# Patient Record
Sex: MALE | Race: WHITE | Employment: OTHER | URBAN - METROPOLITAN AREA
[De-identification: names, ages, dates, MRNs, and addresses within clinical notes are randomized per-mention and may not be internally consistent; named-entity substitution may affect disease eponyms.]

---

## 2017-01-01 ENCOUNTER — HOSPITAL ENCOUNTER (EMERGENCY)
Age: 72
Discharge: HOME | End: 2017-07-25
Attending: EMERGENCY MEDICINE
Payer: COMMERCIAL

## 2017-01-01 ENCOUNTER — PATIENT OUTREACH (OUTPATIENT)
Dept: CASE MANAGEMENT | Age: 72
End: 2017-01-01

## 2017-01-01 ENCOUNTER — HOSPITAL ENCOUNTER (EMERGENCY)
Age: 72
Discharge: HOME | End: 2017-08-02
Payer: COMMERCIAL

## 2017-01-01 ENCOUNTER — APPOINTMENT (OUTPATIENT)
Dept: MRI IMAGING | Age: 72
End: 2017-01-01
Payer: COMMERCIAL

## 2017-01-01 ENCOUNTER — HOSPITAL ENCOUNTER (EMERGENCY)
Age: 72
Discharge: LEFT BEFORE DISCHARGE | End: 2017-07-30
Attending: EMERGENCY MEDICINE
Payer: COMMERCIAL

## 2017-01-01 ENCOUNTER — APPOINTMENT (OUTPATIENT)
Dept: CT IMAGING | Age: 72
End: 2017-01-01
Attending: EMERGENCY MEDICINE
Payer: COMMERCIAL

## 2017-01-01 ENCOUNTER — HOSPITAL ENCOUNTER (EMERGENCY)
Age: 72
Discharge: HOME | End: 2017-11-28
Attending: EMERGENCY MEDICINE
Payer: COMMERCIAL

## 2017-01-01 ENCOUNTER — HOSPITAL ENCOUNTER (EMERGENCY)
Age: 72
Discharge: HOME | End: 2017-09-11
Attending: EMERGENCY MEDICINE
Payer: COMMERCIAL

## 2017-01-01 ENCOUNTER — HOME HEALTH ADMISSION (OUTPATIENT)
Dept: HOME HEALTH SERVICES | Facility: HOME HEALTH | Age: 72
End: 2017-01-01

## 2017-01-01 VITALS
HEART RATE: 74 BPM | TEMPERATURE: 98.3 F | DIASTOLIC BLOOD PRESSURE: 68 MMHG | WEIGHT: 175 LBS | BODY MASS INDEX: 23.7 KG/M2 | RESPIRATION RATE: 16 BRPM | OXYGEN SATURATION: 96 % | SYSTOLIC BLOOD PRESSURE: 139 MMHG | HEIGHT: 72 IN

## 2017-01-01 VITALS
WEIGHT: 175 LBS | OXYGEN SATURATION: 97 % | HEART RATE: 64 BPM | HEIGHT: 72 IN | SYSTOLIC BLOOD PRESSURE: 160 MMHG | DIASTOLIC BLOOD PRESSURE: 70 MMHG | BODY MASS INDEX: 23.7 KG/M2 | RESPIRATION RATE: 18 BRPM | TEMPERATURE: 98.4 F

## 2017-01-01 VITALS
RESPIRATION RATE: 20 BRPM | OXYGEN SATURATION: 100 % | WEIGHT: 165 LBS | BODY MASS INDEX: 24.44 KG/M2 | HEIGHT: 69 IN | DIASTOLIC BLOOD PRESSURE: 78 MMHG | HEART RATE: 67 BPM | SYSTOLIC BLOOD PRESSURE: 158 MMHG | TEMPERATURE: 97.9 F

## 2017-01-01 VITALS
BODY MASS INDEX: 23.7 KG/M2 | TEMPERATURE: 98.8 F | HEART RATE: 90 BPM | DIASTOLIC BLOOD PRESSURE: 81 MMHG | RESPIRATION RATE: 18 BRPM | WEIGHT: 175 LBS | OXYGEN SATURATION: 98 % | SYSTOLIC BLOOD PRESSURE: 178 MMHG | HEIGHT: 72 IN

## 2017-01-01 VITALS
OXYGEN SATURATION: 95 % | HEIGHT: 69 IN | TEMPERATURE: 98.6 F | WEIGHT: 165 LBS | RESPIRATION RATE: 20 BRPM | SYSTOLIC BLOOD PRESSURE: 136 MMHG | HEART RATE: 80 BPM | BODY MASS INDEX: 24.44 KG/M2 | DIASTOLIC BLOOD PRESSURE: 75 MMHG

## 2017-01-01 DIAGNOSIS — F23 ACUTE PSYCHOSIS (HCC): ICD-10-CM

## 2017-01-01 DIAGNOSIS — R42 DIZZINESS: ICD-10-CM

## 2017-01-01 DIAGNOSIS — D64.9 ANEMIA, UNSPECIFIED TYPE: Primary | ICD-10-CM

## 2017-01-01 DIAGNOSIS — R42 VERTIGO: Primary | ICD-10-CM

## 2017-01-01 DIAGNOSIS — K92.2 GASTROINTESTINAL HEMORRHAGE, UNSPECIFIED GASTROINTESTINAL HEMORRHAGE TYPE: ICD-10-CM

## 2017-01-01 DIAGNOSIS — G47.00 INSOMNIA DISORDER WITH NON-SLEEP DISORDER MENTAL COMORBIDITY: Primary | ICD-10-CM

## 2017-01-01 LAB
ALBUMIN SERPL BCP-MCNC: 3.3 G/DL (ref 3.2–4.6)
ALBUMIN SERPL BCP-MCNC: 3.4 G/DL (ref 3.2–4.6)
ALBUMIN SERPL BCP-MCNC: 3.5 G/DL (ref 3.2–4.6)
ALBUMIN SERPL-MCNC: 3 G/DL (ref 3.2–4.6)
ALBUMIN SERPL-MCNC: 3.4 G/DL (ref 3.2–4.6)
ALBUMIN/GLOB SERPL: 0.8 {RATIO} (ref 1.2–3.5)
ALBUMIN/GLOB SERPL: 0.9 {RATIO} (ref 1.2–3.5)
ALBUMIN/GLOB SERPL: 1 {RATIO} (ref 1.2–3.5)
ALP SERPL-CCNC: 50 U/L (ref 50–136)
ALP SERPL-CCNC: 51 U/L (ref 50–136)
ALP SERPL-CCNC: 57 U/L (ref 50–136)
ALP SERPL-CCNC: 57 U/L (ref 50–136)
ALP SERPL-CCNC: 64 U/L (ref 50–136)
ALT SERPL-CCNC: 13 U/L (ref 12–65)
ALT SERPL-CCNC: 20 U/L (ref 12–65)
ALT SERPL-CCNC: 23 U/L (ref 12–65)
ALT SERPL-CCNC: 24 U/L (ref 12–65)
ALT SERPL-CCNC: 26 U/L (ref 12–65)
AMPHET UR QL SCN: NEGATIVE
ANION GAP BLD CALC-SCNC: 10 MMOL/L (ref 7–16)
ANION GAP BLD CALC-SCNC: 7 MMOL/L (ref 7–16)
ANION GAP BLD CALC-SCNC: 8 MMOL/L (ref 7–16)
ANION GAP SERPL CALC-SCNC: 12 MMOL/L (ref 7–16)
ANION GAP SERPL CALC-SCNC: 7 MMOL/L (ref 7–16)
APAP SERPL-MCNC: <2 UG/ML (ref 10–30)
AST SERPL W P-5'-P-CCNC: 13 U/L (ref 15–37)
AST SERPL W P-5'-P-CCNC: 20 U/L (ref 15–37)
AST SERPL W P-5'-P-CCNC: 21 U/L (ref 15–37)
AST SERPL-CCNC: 11 U/L (ref 15–37)
AST SERPL-CCNC: 16 U/L (ref 15–37)
ATRIAL RATE: 77 BPM
ATRIAL RATE: 97 BPM
BARBITURATES UR QL SCN: NEGATIVE
BASOPHILS # BLD AUTO: 0.1 K/UL (ref 0–0.2)
BASOPHILS # BLD AUTO: 0.1 K/UL (ref 0–0.2)
BASOPHILS # BLD: 0.1 K/UL (ref 0–0.2)
BASOPHILS # BLD: 0.1 K/UL (ref 0–0.2)
BASOPHILS # BLD: 1 % (ref 0–2)
BASOPHILS # BLD: 1 % (ref 0–2)
BASOPHILS NFR BLD: 1 % (ref 0–2)
BASOPHILS NFR BLD: 1 % (ref 0–2)
BENZODIAZ UR QL: NEGATIVE
BILIRUB SERPL-MCNC: 0.2 MG/DL (ref 0.2–1.1)
BUN SERPL-MCNC: 18 MG/DL (ref 8–23)
BUN SERPL-MCNC: 20 MG/DL (ref 8–23)
BUN SERPL-MCNC: 21 MG/DL (ref 8–23)
BUN SERPL-MCNC: 21 MG/DL (ref 8–23)
BUN SERPL-MCNC: 30 MG/DL (ref 8–23)
CALCIUM SERPL-MCNC: 8.2 MG/DL (ref 8.3–10.4)
CALCIUM SERPL-MCNC: 8.4 MG/DL (ref 8.3–10.4)
CALCIUM SERPL-MCNC: 8.8 MG/DL (ref 8.3–10.4)
CALCIUM SERPL-MCNC: 9.2 MG/DL (ref 8.3–10.4)
CALCIUM SERPL-MCNC: 9.7 MG/DL (ref 8.3–10.4)
CALCULATED P AXIS, ECG09: 64 DEGREES
CALCULATED P AXIS, ECG09: 73 DEGREES
CALCULATED R AXIS, ECG10: 37 DEGREES
CALCULATED R AXIS, ECG10: 60 DEGREES
CALCULATED T AXIS, ECG11: 56 DEGREES
CALCULATED T AXIS, ECG11: 83 DEGREES
CANNABINOIDS UR QL SCN: NEGATIVE
CHLORIDE SERPL-SCNC: 103 MMOL/L (ref 98–107)
CHLORIDE SERPL-SCNC: 105 MMOL/L (ref 98–107)
CHLORIDE SERPL-SCNC: 108 MMOL/L (ref 98–107)
CK SERPL-CCNC: 59 U/L (ref 21–215)
CO2 SERPL-SCNC: 21 MMOL/L (ref 21–32)
CO2 SERPL-SCNC: 22 MMOL/L (ref 21–32)
CO2 SERPL-SCNC: 23 MMOL/L (ref 21–32)
CO2 SERPL-SCNC: 26 MMOL/L (ref 21–32)
CO2 SERPL-SCNC: 27 MMOL/L (ref 21–32)
COCAINE UR QL SCN: NEGATIVE
CREAT SERPL-MCNC: 1.05 MG/DL (ref 0.8–1.5)
CREAT SERPL-MCNC: 1.06 MG/DL (ref 0.8–1.5)
CREAT SERPL-MCNC: 1.13 MG/DL (ref 0.8–1.5)
CREAT SERPL-MCNC: 1.15 MG/DL (ref 0.8–1.5)
CREAT SERPL-MCNC: 1.15 MG/DL (ref 0.8–1.5)
DIAGNOSIS, 93000: NORMAL
DIAGNOSIS, 93000: NORMAL
DIFFERENTIAL METHOD BLD: ABNORMAL
EOSINOPHIL # BLD: 0.1 K/UL (ref 0–0.8)
EOSINOPHIL # BLD: 0.2 K/UL (ref 0–0.8)
EOSINOPHIL # BLD: 0.2 K/UL (ref 0–0.8)
EOSINOPHIL # BLD: 0.3 K/UL (ref 0–0.8)
EOSINOPHIL NFR BLD: 2 % (ref 0.5–7.8)
EOSINOPHIL NFR BLD: 2 % (ref 0.5–7.8)
EOSINOPHIL NFR BLD: 3 % (ref 0.5–7.8)
EOSINOPHIL NFR BLD: 4 % (ref 0.5–7.8)
ERYTHROCYTE [DISTWIDTH] IN BLOOD BY AUTOMATED COUNT: 13.8 % (ref 11.9–14.6)
ERYTHROCYTE [DISTWIDTH] IN BLOOD BY AUTOMATED COUNT: 13.9 % (ref 11.9–14.6)
ERYTHROCYTE [DISTWIDTH] IN BLOOD BY AUTOMATED COUNT: 14.7 % (ref 11.9–14.6)
ERYTHROCYTE [DISTWIDTH] IN BLOOD BY AUTOMATED COUNT: 15.2 % (ref 11.9–14.6)
ETHANOL SERPL-MCNC: <3 MG/DL
GLOBULIN SER CALC-MCNC: 3.3 G/DL (ref 2.3–3.5)
GLOBULIN SER CALC-MCNC: 3.3 G/DL (ref 2.3–3.5)
GLOBULIN SER CALC-MCNC: 3.7 G/DL (ref 2.3–3.5)
GLOBULIN SER CALC-MCNC: 3.9 G/DL (ref 2.3–3.5)
GLOBULIN SER CALC-MCNC: 4.3 G/DL (ref 2.3–3.5)
GLUCOSE SERPL-MCNC: 104 MG/DL (ref 65–100)
GLUCOSE SERPL-MCNC: 105 MG/DL (ref 65–100)
GLUCOSE SERPL-MCNC: 86 MG/DL (ref 65–100)
GLUCOSE SERPL-MCNC: 87 MG/DL (ref 65–100)
GLUCOSE SERPL-MCNC: 98 MG/DL (ref 65–100)
HCT VFR BLD AUTO: 27.4 % (ref 41.1–50.3)
HCT VFR BLD AUTO: 28.1 % (ref 41.1–50.3)
HCT VFR BLD AUTO: 33.1 % (ref 41.1–50.3)
HCT VFR BLD AUTO: 34.1 % (ref 41.1–50.3)
HGB BLD-MCNC: 11.7 G/DL (ref 13.6–17.2)
HGB BLD-MCNC: 12.2 G/DL (ref 13.6–17.2)
HGB BLD-MCNC: 8.9 G/DL (ref 13.6–17.2)
HGB BLD-MCNC: 9.5 G/DL (ref 13.6–17.2)
IMM GRANULOCYTES # BLD: 0 K/UL (ref 0–0.5)
IMM GRANULOCYTES NFR BLD AUTO: 0 % (ref 0–5)
IMM GRANULOCYTES NFR BLD AUTO: 0.1 % (ref 0–5)
IMM GRANULOCYTES NFR BLD AUTO: 0.3 % (ref 0–5)
IMM GRANULOCYTES NFR BLD: 0.3 % (ref 0–5)
LYMPHOCYTES # BLD AUTO: 12 % (ref 13–44)
LYMPHOCYTES # BLD AUTO: 16 % (ref 13–44)
LYMPHOCYTES # BLD: 0.9 K/UL (ref 0.5–4.6)
LYMPHOCYTES # BLD: 0.9 K/UL (ref 0.5–4.6)
LYMPHOCYTES # BLD: 1 K/UL (ref 0.5–4.6)
LYMPHOCYTES # BLD: 1.4 K/UL (ref 0.5–4.6)
LYMPHOCYTES NFR BLD: 13 % (ref 13–44)
LYMPHOCYTES NFR BLD: 16 % (ref 13–44)
MAGNESIUM SERPL-MCNC: 2.1 MG/DL (ref 1.8–2.4)
MCH RBC QN AUTO: 27.1 PG (ref 26.1–32.9)
MCH RBC QN AUTO: 31.7 PG (ref 26.1–32.9)
MCH RBC QN AUTO: 32.4 PG (ref 26.1–32.9)
MCH RBC QN AUTO: 32.8 PG (ref 26.1–32.9)
MCHC RBC AUTO-ENTMCNC: 32.5 G/DL (ref 31.4–35)
MCHC RBC AUTO-ENTMCNC: 33.8 G/DL (ref 31.4–35)
MCHC RBC AUTO-ENTMCNC: 35.3 G/DL (ref 31.4–35)
MCHC RBC AUTO-ENTMCNC: 35.8 G/DL (ref 31.4–35)
MCV RBC AUTO: 83.3 FL (ref 79.6–97.8)
MCV RBC AUTO: 91.7 FL (ref 79.6–97.8)
MCV RBC AUTO: 91.7 FL (ref 79.6–97.8)
MCV RBC AUTO: 93.7 FL (ref 79.6–97.8)
METHADONE UR QL: NEGATIVE
MONOCYTES # BLD: 0.5 K/UL (ref 0.1–1.3)
MONOCYTES # BLD: 0.6 K/UL (ref 0.1–1.3)
MONOCYTES # BLD: 0.8 K/UL (ref 0.1–1.3)
MONOCYTES # BLD: 0.9 K/UL (ref 0.1–1.3)
MONOCYTES NFR BLD AUTO: 8 % (ref 4–12)
MONOCYTES NFR BLD AUTO: 8 % (ref 4–12)
MONOCYTES NFR BLD: 12 % (ref 4–12)
MONOCYTES NFR BLD: 9 % (ref 4–12)
NEUTS SEG # BLD: 4.4 K/UL (ref 1.7–8.2)
NEUTS SEG # BLD: 5.4 K/UL (ref 1.7–8.2)
NEUTS SEG # BLD: 6.1 K/UL (ref 1.7–8.2)
NEUTS SEG # BLD: 6.7 K/UL (ref 1.7–8.2)
NEUTS SEG NFR BLD AUTO: 72 % (ref 43–78)
NEUTS SEG NFR BLD AUTO: 77 % (ref 43–78)
NEUTS SEG NFR BLD: 70 % (ref 43–78)
NEUTS SEG NFR BLD: 72 % (ref 43–78)
OPIATES UR QL: NEGATIVE
P-R INTERVAL, ECG05: 178 MS
P-R INTERVAL, ECG05: 182 MS
PCP UR QL: NEGATIVE
PLATELET # BLD AUTO: 240 K/UL (ref 150–450)
PLATELET # BLD AUTO: 274 K/UL (ref 150–450)
PLATELET # BLD AUTO: 289 K/UL (ref 150–450)
PLATELET # BLD AUTO: 391 K/UL (ref 150–450)
PMV BLD AUTO: 10.3 FL (ref 10.8–14.1)
PMV BLD AUTO: 10.4 FL (ref 10.8–14.1)
PMV BLD AUTO: 10.6 FL (ref 10.8–14.1)
PMV BLD AUTO: 10.7 FL (ref 10.8–14.1)
POTASSIUM SERPL-SCNC: 3.9 MMOL/L (ref 3.5–5.1)
POTASSIUM SERPL-SCNC: 4.2 MMOL/L (ref 3.5–5.1)
POTASSIUM SERPL-SCNC: 4.4 MMOL/L (ref 3.5–5.1)
POTASSIUM SERPL-SCNC: 4.6 MMOL/L (ref 3.5–5.1)
POTASSIUM SERPL-SCNC: 5 MMOL/L (ref 3.5–5.1)
PROT SERPL-MCNC: 6.3 G/DL (ref 6.3–8.2)
PROT SERPL-MCNC: 6.6 G/DL (ref 6.3–8.2)
PROT SERPL-MCNC: 7.1 G/DL (ref 6.3–8.2)
PROT SERPL-MCNC: 7.3 G/DL (ref 6.3–8.2)
PROT SERPL-MCNC: 7.8 G/DL (ref 6.3–8.2)
Q-T INTERVAL, ECG07: 338 MS
Q-T INTERVAL, ECG07: 374 MS
QRS DURATION, ECG06: 84 MS
QRS DURATION, ECG06: 90 MS
QTC CALCULATION (BEZET), ECG08: 423 MS
QTC CALCULATION (BEZET), ECG08: 429 MS
RBC # BLD AUTO: 3 M/UL (ref 4.23–5.67)
RBC # BLD AUTO: 3.29 M/UL (ref 4.23–5.67)
RBC # BLD AUTO: 3.61 M/UL (ref 4.23–5.67)
RBC # BLD AUTO: 3.72 M/UL (ref 4.23–5.67)
SALICYLATES SERPL-MCNC: 6.8 MG/DL (ref 2.8–20)
SODIUM SERPL-SCNC: 137 MMOL/L (ref 136–145)
SODIUM SERPL-SCNC: 138 MMOL/L (ref 136–145)
SODIUM SERPL-SCNC: 139 MMOL/L (ref 136–145)
SODIUM SERPL-SCNC: 139 MMOL/L (ref 136–145)
SODIUM SERPL-SCNC: 142 MMOL/L (ref 136–145)
TSH SERPL DL<=0.005 MIU/L-ACNC: 5.75 UIU/ML (ref 0.36–3.74)
TSH SERPL DL<=0.005 MIU/L-ACNC: 7.43 UIU/ML (ref 0.36–3.74)
VENTRICULAR RATE, ECG03: 77 BPM
VENTRICULAR RATE, ECG03: 97 BPM
WBC # BLD AUTO: 6.1 K/UL (ref 4.3–11.1)
WBC # BLD AUTO: 7.7 K/UL (ref 4.3–11.1)
WBC # BLD AUTO: 7.9 K/UL (ref 4.3–11.1)
WBC # BLD AUTO: 9.2 K/UL (ref 4.3–11.1)

## 2017-01-01 PROCEDURE — 74011000250 HC RX REV CODE- 250: Performed by: EMERGENCY MEDICINE

## 2017-01-01 PROCEDURE — 81003 URINALYSIS AUTO W/O SCOPE: CPT | Performed by: EMERGENCY MEDICINE

## 2017-01-01 PROCEDURE — 74011250636 HC RX REV CODE- 250/636

## 2017-01-01 PROCEDURE — 85025 COMPLETE CBC W/AUTO DIFF WBC: CPT

## 2017-01-01 PROCEDURE — 80053 COMPREHEN METABOLIC PANEL: CPT

## 2017-01-01 PROCEDURE — 80053 COMPREHEN METABOLIC PANEL: CPT | Performed by: EMERGENCY MEDICINE

## 2017-01-01 PROCEDURE — 96372 THER/PROPH/DIAG INJ SC/IM: CPT

## 2017-01-01 PROCEDURE — 96374 THER/PROPH/DIAG INJ IV PUSH: CPT

## 2017-01-01 PROCEDURE — 93005 ELECTROCARDIOGRAM TRACING: CPT | Performed by: EMERGENCY MEDICINE

## 2017-01-01 PROCEDURE — 96376 TX/PRO/DX INJ SAME DRUG ADON: CPT

## 2017-01-01 PROCEDURE — 96375 TX/PRO/DX INJ NEW DRUG ADDON: CPT

## 2017-01-01 PROCEDURE — 84443 ASSAY THYROID STIM HORMONE: CPT | Performed by: EMERGENCY MEDICINE

## 2017-01-01 PROCEDURE — 93005 ELECTROCARDIOGRAM TRACING: CPT

## 2017-01-01 PROCEDURE — 74011250637 HC RX REV CODE- 250/637: Performed by: EMERGENCY MEDICINE

## 2017-01-01 PROCEDURE — 82550 ASSAY OF CK (CPK): CPT | Performed by: EMERGENCY MEDICINE

## 2017-01-01 PROCEDURE — 80307 DRUG TEST PRSMV CHEM ANLYZR: CPT | Performed by: EMERGENCY MEDICINE

## 2017-01-01 PROCEDURE — 96372 THER/PROPH/DIAG INJ SC/IM: CPT | Performed by: EMERGENCY MEDICINE

## 2017-01-01 PROCEDURE — 83735 ASSAY OF MAGNESIUM: CPT | Performed by: EMERGENCY MEDICINE

## 2017-01-01 PROCEDURE — 85025 COMPLETE CBC W/AUTO DIFF WBC: CPT | Performed by: EMERGENCY MEDICINE

## 2017-01-01 PROCEDURE — 74011250636 HC RX REV CODE- 250/636: Performed by: EMERGENCY MEDICINE

## 2017-01-01 PROCEDURE — 96375 TX/PRO/DX INJ NEW DRUG ADDON: CPT | Performed by: EMERGENCY MEDICINE

## 2017-01-01 PROCEDURE — 99284 EMERGENCY DEPT VISIT MOD MDM: CPT

## 2017-01-01 PROCEDURE — 96376 TX/PRO/DX INJ SAME DRUG ADON: CPT | Performed by: EMERGENCY MEDICINE

## 2017-01-01 PROCEDURE — 70450 CT HEAD/BRAIN W/O DYE: CPT

## 2017-01-01 PROCEDURE — 75810000275 HC EMERGENCY DEPT VISIT NO LEVEL OF CARE: Performed by: EMERGENCY MEDICINE

## 2017-01-01 PROCEDURE — 99285 EMERGENCY DEPT VISIT HI MDM: CPT | Performed by: EMERGENCY MEDICINE

## 2017-01-01 PROCEDURE — 99283 EMERGENCY DEPT VISIT LOW MDM: CPT | Performed by: EMERGENCY MEDICINE

## 2017-01-01 PROCEDURE — 96374 THER/PROPH/DIAG INJ IV PUSH: CPT | Performed by: EMERGENCY MEDICINE

## 2017-01-01 PROCEDURE — 81003 URINALYSIS AUTO W/O SCOPE: CPT

## 2017-01-01 PROCEDURE — 74011250637 HC RX REV CODE- 250/637

## 2017-01-01 RX ORDER — SODIUM CHLORIDE 0.9 % (FLUSH) 0.9 %
5-10 SYRINGE (ML) INJECTION AS NEEDED
Status: DISCONTINUED | OUTPATIENT
Start: 2017-01-01 | End: 2017-01-01 | Stop reason: HOSPADM

## 2017-01-01 RX ORDER — MECLIZINE HCL 12.5 MG 12.5 MG/1
12.5 TABLET ORAL
Qty: 15 TAB | Refills: 0 | Status: SHIPPED | OUTPATIENT
Start: 2017-01-01 | End: 2017-01-01

## 2017-01-01 RX ORDER — HALOPERIDOL 5 MG/ML
10 INJECTION INTRAMUSCULAR
Status: COMPLETED | OUTPATIENT
Start: 2017-01-01 | End: 2017-01-01

## 2017-01-01 RX ORDER — MECLIZINE HYDROCHLORIDE 25 MG/1
25 TABLET ORAL
Qty: 30 TAB | Refills: 0 | Status: SHIPPED | OUTPATIENT
Start: 2017-01-01 | End: 2017-01-01

## 2017-01-01 RX ORDER — LORAZEPAM 2 MG/ML
0.5 INJECTION INTRAMUSCULAR
Status: COMPLETED | OUTPATIENT
Start: 2017-01-01 | End: 2017-01-01

## 2017-01-01 RX ORDER — LORAZEPAM 2 MG/ML
1 INJECTION INTRAMUSCULAR
Status: COMPLETED | OUTPATIENT
Start: 2017-01-01 | End: 2017-01-01

## 2017-01-01 RX ORDER — PANTOPRAZOLE SODIUM 40 MG/1
40 TABLET, DELAYED RELEASE ORAL
Status: COMPLETED | OUTPATIENT
Start: 2017-01-01 | End: 2017-01-01

## 2017-01-01 RX ORDER — LORAZEPAM 2 MG/ML
INJECTION INTRAMUSCULAR
Status: COMPLETED
Start: 2017-01-01 | End: 2017-01-01

## 2017-01-01 RX ORDER — SODIUM CHLORIDE 0.9 % (FLUSH) 0.9 %
5-10 SYRINGE (ML) INJECTION EVERY 8 HOURS
Status: DISCONTINUED | OUTPATIENT
Start: 2017-01-01 | End: 2017-01-01 | Stop reason: HOSPADM

## 2017-01-01 RX ORDER — MECLIZINE HYDROCHLORIDE 25 MG/1
25 TABLET ORAL
COMMUNITY
End: 2017-01-01 | Stop reason: ALTCHOICE

## 2017-01-01 RX ORDER — MECLIZINE HYDROCHLORIDE 25 MG/1
50 TABLET ORAL
Status: COMPLETED | OUTPATIENT
Start: 2017-01-01 | End: 2017-01-01

## 2017-01-01 RX ORDER — TRAZODONE HYDROCHLORIDE 50 MG/1
50 TABLET ORAL
Qty: 31 TAB | Refills: 3 | Status: SHIPPED | OUTPATIENT
Start: 2017-01-01

## 2017-01-01 RX ORDER — DIPHENHYDRAMINE HYDROCHLORIDE 50 MG/ML
50 INJECTION, SOLUTION INTRAMUSCULAR; INTRAVENOUS
Status: COMPLETED | OUTPATIENT
Start: 2017-01-01 | End: 2017-01-01

## 2017-01-01 RX ORDER — HALOPERIDOL 5 MG/ML
5 INJECTION INTRAMUSCULAR
Status: DISCONTINUED | OUTPATIENT
Start: 2017-01-01 | End: 2017-01-01

## 2017-01-01 RX ORDER — MECLIZINE HYDROCHLORIDE 25 MG/1
25 TABLET ORAL
Status: DISCONTINUED | OUTPATIENT
Start: 2017-01-01 | End: 2017-01-01 | Stop reason: HOSPADM

## 2017-01-01 RX ORDER — LORAZEPAM 1 MG/1
1 TABLET ORAL
Status: COMPLETED | OUTPATIENT
Start: 2017-01-01 | End: 2017-01-01

## 2017-01-01 RX ORDER — SUCRALFATE 1 G/10ML
1 SUSPENSION ORAL
Status: COMPLETED | OUTPATIENT
Start: 2017-01-01 | End: 2017-01-01

## 2017-01-01 RX ORDER — DIPHENHYDRAMINE HYDROCHLORIDE 50 MG/ML
25 INJECTION, SOLUTION INTRAMUSCULAR; INTRAVENOUS
Status: COMPLETED | OUTPATIENT
Start: 2017-01-01 | End: 2017-01-01

## 2017-01-01 RX ORDER — PANTOPRAZOLE SODIUM 40 MG/1
40 TABLET, DELAYED RELEASE ORAL DAILY
Qty: 20 TAB | Refills: 0 | Status: SHIPPED | OUTPATIENT
Start: 2017-01-01 | End: 2017-01-01

## 2017-01-01 RX ADMIN — PANTOPRAZOLE SODIUM 40 MG: 40 TABLET, DELAYED RELEASE ORAL at 23:25

## 2017-01-01 RX ADMIN — LORAZEPAM 1 MG: 2 INJECTION, SOLUTION INTRAMUSCULAR; INTRAVENOUS at 07:58

## 2017-01-01 RX ADMIN — HALOPERIDOL LACTATE 10 MG: 5 INJECTION, SOLUTION INTRAMUSCULAR at 01:40

## 2017-01-01 RX ADMIN — LORAZEPAM 1 MG: 1 TABLET ORAL at 01:41

## 2017-01-01 RX ADMIN — SUCRALFATE 1 G: 1 SUSPENSION ORAL at 23:26

## 2017-01-01 RX ADMIN — DIPHENHYDRAMINE HYDROCHLORIDE 25 MG: 50 INJECTION, SOLUTION INTRAMUSCULAR; INTRAVENOUS at 03:56

## 2017-01-01 RX ADMIN — LORAZEPAM 0.5 MG: 2 INJECTION, SOLUTION INTRAMUSCULAR; INTRAVENOUS at 15:59

## 2017-01-01 RX ADMIN — HALOPERIDOL LACTATE 10 MG: 5 INJECTION, SOLUTION INTRAMUSCULAR at 03:56

## 2017-01-01 RX ADMIN — DIPHENHYDRAMINE HYDROCHLORIDE 50 MG: 50 INJECTION, SOLUTION INTRAMUSCULAR; INTRAVENOUS at 01:42

## 2017-01-01 RX ADMIN — WATER 10 MG: 1 INJECTION INTRAMUSCULAR; INTRAVENOUS; SUBCUTANEOUS at 19:52

## 2017-01-01 RX ADMIN — LORAZEPAM 1 MG: 2 INJECTION, SOLUTION INTRAMUSCULAR; INTRAVENOUS at 11:03

## 2017-01-01 RX ADMIN — LORAZEPAM 1 MG: 2 INJECTION INTRAMUSCULAR at 11:03

## 2017-01-01 RX ADMIN — LORAZEPAM 0.5 MG: 2 INJECTION INTRAMUSCULAR; INTRAVENOUS at 19:26

## 2017-01-01 RX ADMIN — MECLIZINE HYDROCHLORIDE 50 MG: 25 TABLET ORAL at 03:20

## 2017-07-25 NOTE — ED TRIAGE NOTES
Pt sent from [facility] Care today with c/o dizziness x's 2-3 days. Poor memory that started a week ago and ringing to left ear per pt.

## 2017-07-25 NOTE — DISCHARGE INSTRUCTIONS
Try medication. Call ENT doctior for appt to recheck. Also, call your primary care doctor to recheck. If symptoms continue, an MRI may be useful. Dizziness: Care Instructions  Your Care Instructions  Dizziness is the feeling of unsteadiness or fuzziness in your head. It is different than having vertigo, which is a feeling that the room is spinning or that you are moving or falling. It is also different from lightheadedness, which is the feeling that you are about to faint. It can be hard to know what causes dizziness. Some people feel dizzy when they have migraine headaches. Sometimes bouts of flu can make you feel dizzy. Some medical conditions, such as heart problems or high blood pressure, can make you feel dizzy. Many medicines can cause dizziness, including medicines for high blood pressure, pain, or anxiety. If a medicine causes your symptoms, your doctor may recommend that you stop or change the medicine. If it is a problem with your heart, you may need medicine to help your heart work better. If there is no clear reason for your symptoms, your doctor may suggest watching and waiting for a while to see if the dizziness goes away on its own. Follow-up care is a key part of your treatment and safety. Be sure to make and go to all appointments, and call your doctor if you are having problems. It's also a good idea to know your test results and keep a list of the medicines you take. How can you care for yourself at home? · If your doctor recommends or prescribes medicine, take it exactly as directed. Call your doctor if you think you are having a problem with your medicine. · Do not drive while you feel dizzy. · Try to prevent falls. Steps you can take include:  ¨ Using nonskid mats, adding grab bars near the tub, and using night-lights. ¨ Clearing your home so that walkways are free of anything you might trip on. ¨ Letting family and friends know that you have been feeling dizzy.  This will help them know how to help you. When should you call for help? Call 911 anytime you think you may need emergency care. For example, call if:  · You passed out (lost consciousness). · You have dizziness along with symptoms of a heart attack. These may include:  ¨ Chest pain or pressure, or a strange feeling in the chest.  ¨ Sweating. ¨ Shortness of breath. ¨ Nausea or vomiting. ¨ Pain, pressure, or a strange feeling in the back, neck, jaw, or upper belly or in one or both shoulders or arms. ¨ Lightheadedness or sudden weakness. ¨ A fast or irregular heartbeat. · You have symptoms of a stroke. These may include:  ¨ Sudden numbness, tingling, weakness, or loss of movement in your face, arm, or leg, especially on only one side of your body. ¨ Sudden vision changes. ¨ Sudden trouble speaking. ¨ Sudden confusion or trouble understanding simple statements. ¨ Sudden problems with walking or balance. ¨ A sudden, severe headache that is different from past headaches. Call your doctor now or seek immediate medical care if:  · You feel dizzy and have a fever, headache, or ringing in your ears. · You have new or increased nausea and vomiting. · Your dizziness does not go away or comes back. Watch closely for changes in your health, and be sure to contact your doctor if:  · You do not get better as expected. Where can you learn more? Go to http://agueda-matthew.info/. Enter I990 in the search box to learn more about \"Dizziness: Care Instructions. \"  Current as of: March 20, 2017  Content Version: 11.3  © 5573-9443 [employer]. Care instructions adapted under license by [employer] (which disclaims liability or warranty for this information). If you have questions about a medical condition or this instruction, always ask your healthcare professional. [address] any warranty or liability for your use of this information.          Vertigo: Care Instructions  Your Care Instructions  Vertigo is the feeling that you or your surroundings are moving when there is no actual movement. It is often described as a feeling of spinning, whirling, falling, or tilting. Vertigo may make you vomit or feel nauseated. You may have trouble standing or walking and may lose your balance. Vertigo is often related to an inner ear problem, but it can have other more serious causes. If vertigo continues, you may need more tests to find its cause. Follow-up care is a key part of your treatment and safety. Be sure to make and go to all appointments, and call your doctor if you are having problems. Its also a good idea to know your test results and keep a list of the medicines you take. How can you care for yourself at home? · Do not lie flat on your back. Prop yourself up slightly. This may reduce the spinning feeling. Keep your eyes open. · Move slowly so that you do not fall. · If your doctor recommends medicine, take it exactly as directed. · Do not drive while you are having vertigo. Certain exercises, called Mendez-Daroff exercises, can help decrease vertigo. To do Mendez-Daroff exercises:  · Sit on the edge of a bed or sofa and quickly lie down on the side that causes the worst vertigo. Lie on your side with your ear down. · Stay in this position for at least 30 seconds or until the vertigo goes away. · Sit up. If this causes vertigo, wait for it to stop. · Repeat the procedure on the other side. · Repeat this 10 times. Do these exercises 2 times a day until the vertigo is gone. When should you call for help? Call 911 anytime you think you may need emergency care. For example, call if:  · You passed out (lost consciousness). · You have symptoms of a stroke. These may include:  ¨ Sudden numbness, tingling, weakness, or loss of movement in your face, arm, or leg, especially on only one side of your body. ¨ Sudden vision changes.   ¨ Sudden trouble speaking. ¨ Sudden confusion or trouble understanding simple statements. ¨ Sudden problems with walking or balance. ¨ A sudden, severe headache that is different from past headaches. Call your doctor now or seek immediate medical care if:  · Vertigo occurs with a fever, a headache, or ringing in your ears. · You have new or increased nausea and vomiting. Watch closely for changes in your health, and be sure to contact your doctor if:  · Vertigo gets worse or happens more often. · Vertigo has not gotten better after 2 weeks. Where can you learn more? Go to http://agueda-matthew.info/. Enter F717 in the search box to learn more about \"Vertigo: Care Instructions. \"  Current as of: July 29, 2016  Content Version: 11.3  © 4384-3148 [employer]. Care instructions adapted under license by [employer] (which disclaims liability or warranty for this information). If you have questions about a medical condition or this instruction, always ask your healthcare professional. [place] [address] any warranty or liability for your use of this information.

## 2017-07-25 NOTE — ED PROVIDER NOTES
HPI Comments: 71-year-old male complains of dizziness for about 3 days. Worst when he stands. Some sensation of feeling off balance and feel like the room is spinning around and then again he states that he feels lightheaded at times. Complains of memory problems for a few weeks as well. No headache. No chest pain. No shortness of breath or fever. No cough. No change in vision speech or swallowing. No focal numbness or weakness. Seen at doctor's care and referred here. As a family doctor that treats him for hypertension and increased cholesterol and hypothyroidism. Patient is a 66 y.o. male presenting with dizziness. The history is provided by the patient. Dizziness   This is a new problem. The current episode started more than 2 days ago. The problem has not changed since onset. Primary symptoms include loss of balance and memory loss. Pertinent negatives include no focal weakness, no loss of sensation, no slurred speech, no speech difficulty, no movement disorder and no visual change. There has been no fever. Pertinent negatives include no shortness of breath, no chest pain, no vomiting, no headaches and no nausea. Past Medical History:   Diagnosis Date    Aortic valve regurgitation 11/23/2009    Chest Pain  7/6/2009    Dyslipidemia 7/6/2009    Endocrine disease     hypothyroid    Essential hypertension, benign 7/23/2009    Heart murmur 7/23/2009    Hyperlipidemia 8/8/2016    Hypothyroidism 7/6/2009    Tobacco use disorder 7/6/2009       History reviewed. No pertinent surgical history. History reviewed. No pertinent family history. Social History     Social History    Marital status: SINGLE     Spouse name: N/A    Number of children: N/A    Years of education: N/A     Occupational History    Not on file.      Social History Main Topics    Smoking status: Current Every Day Smoker     Packs/day: 1.00    Smokeless tobacco: Never Used    Alcohol use No    Drug use: No    Sexual activity: No     Other Topics Concern    Not on file     Social History Narrative         ALLERGIES: Shrimp and Tetanus vaccines and toxoid    Review of Systems   Constitutional: Negative for chills and fever. HENT: Positive for tinnitus (on occasion). Negative for ear pain. Respiratory: Negative for cough and shortness of breath. Cardiovascular: Negative for chest pain and palpitations. Gastrointestinal: Negative for abdominal pain, diarrhea, nausea and vomiting. Genitourinary: Negative for dysuria and flank pain. Musculoskeletal: Negative for back pain and neck pain. Skin: Negative for color change and rash. Neurological: Positive for dizziness and loss of balance. Negative for focal weakness, syncope, speech difficulty and headaches. Psychiatric/Behavioral: Positive for memory loss. All other systems reviewed and are negative. Vitals:    07/24/17 2008   BP: 160/70   Pulse: 90   Resp: 18   Temp: 98.8 °F (37.1 °C)   SpO2: 98%   Weight: 79.4 kg (175 lb)   Height: 6' (1.829 m)            Physical Exam   Constitutional: He is oriented to person, place, and time. He appears well-developed and well-nourished. No distress. HENT:   Head: Normocephalic and atraumatic. Mouth/Throat: Oropharynx is clear and moist. No oropharyngeal exudate. Eyes: Conjunctivae and EOM are normal. Pupils are equal, round, and reactive to light. Neck: Normal range of motion. Neck supple. Cardiovascular: Normal rate, regular rhythm and intact distal pulses. No murmur heard. Pulmonary/Chest: Breath sounds normal. No respiratory distress. Abdominal: No hernia. Neurological: He is alert and oriented to person, place, and time. Coordination and gait normal.   Nl speech  Ambulatory. No drift. Normal finger to nose and heel to shin testing. Skin: Skin is warm and dry. Psychiatric: He has a normal mood and affect. His speech is normal.   Nursing note and vitals reviewed.        MDM  Number of Diagnoses or Management Options  Diagnosis management comments: Head CT. Check electrolytes. Check thyroid. Patient has normal EKG CBC and urine from urgent care       Amount and/or Complexity of Data Reviewed  Clinical lab tests: reviewed and ordered  Tests in the radiology section of CPT®: ordered and reviewed  Independent visualization of images, tracings, or specimens: yes    Risk of Complications, Morbidity, and/or Mortality  Presenting problems: moderate  Diagnostic procedures: low  Management options: moderate      ED Course       Procedures    No obvious evidence for posterior ischemia. Given history of tinnitus, would favor peripheral vertigo         Results Include:    Recent Results (from the past 24 hour(s))   METABOLIC PANEL, COMPREHENSIVE    Collection Time: 07/24/17 11:08 PM   Result Value Ref Range    Sodium 137 136 - 145 mmol/L    Potassium 4.6 3.5 - 5.1 mmol/L    Chloride 103 98 - 107 mmol/L    CO2 27 21 - 32 mmol/L    Anion gap 7 7 - 16 mmol/L    Glucose 105 (H) 65 - 100 mg/dL    BUN 20 8 - 23 MG/DL    Creatinine 1.06 0.8 - 1.5 MG/DL    GFR est AA >60 >60 ml/min/1.73m2    GFR est non-AA >60 >60 ml/min/1.73m2    Calcium 9.2 8.3 - 10.4 MG/DL    Bilirubin, total 0.2 0.2 - 1.1 MG/DL    ALT (SGPT) 23 12 - 65 U/L    AST (SGOT) 21 15 - 37 U/L    Alk. phosphatase 57 50 - 136 U/L    Protein, total 7.8 6.3 - 8.2 g/dL    Albumin 3.5 3.2 - 4.6 g/dL    Globulin 4.3 (H) 2.3 - 3.5 g/dL    A-G Ratio 0.8 (L) 1.2 - 3.5     TSH 3RD GENERATION    Collection Time: 07/24/17 11:08 PM   Result Value Ref Range    TSH 7.433 (H) 0.358 - 3.740 uIU/mL     Trial of antivert.  ENT

## 2017-07-30 NOTE — ED NOTES
Patient requesting IV out notified that he would next to go back to bed continued to state that he wants to leave--family member also requesting patient to have IV removed--IV removed and patient is seen walking out of ed with visitor at this time

## 2017-08-02 NOTE — LETTER
NOTIFICATION RETURN TO WORK / SCHOOL 
 
8/2/2017 4:39 PM 
 
Mr. Anabela Edgar [address] [place] [address] Dr [id number] To Whom It May Concern: 
 
Anabela Edgar is currently under the care of [facility] EMERGENCY DEPT. Mr. Yordy Mojica is here to  patient and had to be excused from work early under ER doctor and RN's permission. Mr. Yordy Mojica may be excused from work today or may be excused from work early and may return today 8/2/17. He will return to work/school on: 8/3/17 If there are questions or concerns please have the patient contact our office.  
 
 
 
Sincerely, 
 
 
Pooja Whyte RN

## 2017-08-02 NOTE — ED NOTES
Pt began to become anxious in MRI again for second time. RN walked ativan 2 mg down per Dr. Sindy Sarkar to see if that helps calm patient down. MRI states they will try again to see if pt will cooperate.

## 2017-08-02 NOTE — ED TRIAGE NOTES
Pt arrives via [facility] from home. Per ems family called due to dizziness over last 2 weeks with progressive worsening tonight. Recently diagnosed new onset dementia. Pt a/o x1 on arrival, unable to verbalize year or . bgl 118 enroute with ems.

## 2017-08-02 NOTE — ED NOTES
RN called and spoke to [facility] from MRI to ask when pt will be able to get MRI scan. [facility] states pt will be put in transport \"in 10 minutes. \" Charge nurse has been notified.

## 2017-08-02 NOTE — ED NOTES
Bonny Ask, pt. Nephew called, no answer, voicemail left. Pt. Still sleeping at this time. NAD noted.

## 2017-08-02 NOTE — ED NOTES
MRI states they will send pt back due to the fact that pt will not stay still enough for MRI scan. Pt is stating \"I'm hungry, I want to walk out, I'm in pain. \"

## 2017-08-02 NOTE — ED NOTES
Pt sitting up on the side of the bed drinking coffee that RN provided pt. JANNY Hernandez spoke to [place] and he states he will get off work to come pick pt up and needs a work note to return back to work.

## 2017-08-02 NOTE — ED NOTES
Darien,nephew called, call forwarded. Bari Ace notified, and states he is currently at work and can not pick the patient up at this time and states he will see if his middle brother can pick the patient up.

## 2017-08-02 NOTE — ED NOTES
Called and spoke to MRI tech to see when pt will be transported for MRI scan. Tech stated pt was agitated the first time, RN told tech that she was aware and medicated pt. Pt is currently sleeping and MRI is aware. RN told tech that pt is able to follow commands and pt needs help with orientation when pt wakes up. Ashley stated \"we'll try. \"

## 2017-08-02 NOTE — ED NOTES
Attempted MRI on 2 separate occasions. The patient would not tolerate the confines of the MRI even with sedation.

## 2017-08-02 NOTE — ED NOTES
Pt to MRI and was advised about what MRI will scan and was notified that he has to be still for MRI scan. Pt states his understanding to RN.

## 2017-08-02 NOTE — ED NOTES
I have reviewed discharge instructions with the patient and caregiver (newphew). The patient and caregiver (nephew) verbalized understanding. Prescriptions given times one.

## 2017-08-02 NOTE — PROGRESS NOTES
Spiritual Care visit. Initial visit. By request, Visited 66year old patient in ED. Patient has some dementia, and is concerned about his physical, mental, emotional and spiritual life.  was called by RN to try to help calm him down.  spoke to him, trying to give him reassurance. Prayed for his spiritual and emotional concerns,  Encouraged him to tell physician about his physical and mental concerns.     Visit by Aayush Banks M.Ed., Th.B. ,Staff

## 2017-08-02 NOTE — ED NOTES
Dareen Sires for second time and he picked up. RN notified that pt is up for d/c and needs to be picked up. Dain Clarice states he is currently at work and gave another number for Leslie, [facility] [employer] [facility] [place] brother,  [phone]. RN called Leslie once and did not . Oziel Rossi states he will not be able to pick patient up until after 8 because he is currently at work.

## 2017-08-02 NOTE — ED NOTES
Halle Gates from MRI states pt is become increasingly combative ever since RN left and administered 1mg ativan. Pt was on the MRI table confused and verbalizing. When RN and Angelo Valencia from MRI released pts strap to administer ativan 1 mg. Pt began to tense up and RN had to hold pts arm down. Pt was not kicking, only movements of upper extremity. RN returned back to the ER and MRI calls again that pt is increasingly combative ever since RN left MRI after administration of meds. Dr. James Byrd has been paged overhead to speak to MRI. MRI techs state that pt will have to be taken off the MRI table and placed back in bed due to the combativeness and lack of cooperation.

## 2017-08-02 NOTE — ED PROVIDER NOTES
HPI Comments: 71-year-old male complaining of dizziness. Patient was recently seen in ED for some more complaints had a workup including a CT and lab work were all normal patient was diagnosed with dementia recently as well    Patient is a 66 y.o. male presenting with dizziness. The history is provided by the patient. Dizziness   This is a new problem. The problem has not changed since onset. Primary symptoms include mental status change and disorientation. There has been no fever. Associated symptoms include altered mental status and confusion. Past Medical History:   Diagnosis Date    Aortic valve regurgitation 11/23/2009    Chest Pain  7/6/2009    Dyslipidemia 7/6/2009    Endocrine disease     hypothyroid    Essential hypertension, benign 7/23/2009    Heart murmur 7/23/2009    Hyperlipidemia 8/8/2016    Hypothyroidism 7/6/2009    Tobacco use disorder 7/6/2009       No past surgical history on file. No family history on file. Social History     Social History    Marital status: SINGLE     Spouse name: N/A    Number of children: N/A    Years of education: N/A     Occupational History    Not on file. Social History Main Topics    Smoking status: Current Every Day Smoker     Packs/day: 1.00    Smokeless tobacco: Never Used    Alcohol use No    Drug use: No    Sexual activity: No     Other Topics Concern    Not on file     Social History Narrative         ALLERGIES: Shrimp and Tetanus vaccines and toxoid    Review of Systems   Constitutional: Negative. Negative for activity change. HENT: Negative. Eyes: Negative. Respiratory: Negative. Cardiovascular: Negative. Gastrointestinal: Negative. Genitourinary: Negative. Musculoskeletal: Negative. Skin: Negative. Neurological: Positive for dizziness. Psychiatric/Behavioral: Positive for confusion. All other systems reviewed and are negative.       Vitals:    08/02/17 0248   BP: 156/85   Pulse: 99   Resp: 20 Temp: 98.5 °F (36.9 °C)   SpO2: 99%   Weight: 79.4 kg (175 lb)   Height: 6' (1.829 m)            Physical Exam   Constitutional: He is oriented to person, place, and time. He appears well-developed and well-nourished. No distress. HENT:   Head: Normocephalic and atraumatic. Right Ear: External ear normal.   Left Ear: External ear normal.   Nose: Nose normal.   Mouth/Throat: Oropharynx is clear and moist. No oropharyngeal exudate. Eyes: Conjunctivae and EOM are normal. Pupils are equal, round, and reactive to light. Right eye exhibits no discharge. Left eye exhibits no discharge. No scleral icterus. Neck: Normal range of motion. Neck supple. No JVD present. No tracheal deviation present. Cardiovascular: Normal rate, regular rhythm and intact distal pulses. Pulmonary/Chest: Effort normal and breath sounds normal. No stridor. No respiratory distress. He has no wheezes. He exhibits no tenderness. Abdominal: Soft. Bowel sounds are normal. He exhibits no distension and no mass. There is no tenderness. Musculoskeletal: Normal range of motion. He exhibits no edema or tenderness. Neurological: He is alert and oriented to person, place, and time. No cranial nerve deficit. Skin: Skin is warm and dry. No rash noted. He is not diaphoretic. No erythema. No pallor. Psychiatric: Thought content normal. His affect is labile. He is agitated. Cognition and memory are impaired. Nursing note and vitals reviewed. MDM  Number of Diagnoses or Management Options  Diagnosis management comments: Patient dizzy and workups are negative ×2. Patient lives alone. We will hold the patient until  get involved with possible placement or assistance at home.        Amount and/or Complexity of Data Reviewed  Clinical lab tests: ordered and reviewed  Tests in the radiology section of CPT®: reviewed  Tests in the medicine section of CPT®: ordered and reviewed    Risk of Complications, Morbidity, and/or Mortality  Presenting problems: high  Diagnostic procedures: high  Management options: high      ED Course       Procedures

## 2017-08-02 NOTE — ED NOTES
Called pts nephew, Don Vickers, with no success. Don Vickers never picked up the phone, neither did Alice Ureña, pts other nephew.

## 2017-08-02 NOTE — PROGRESS NOTES
Spiritual Care Visit, in the Emergency Department. Follow up. Attempted visit. Patient was Prayer, for patient. Left a card for patient at bedside. Signed by [facility] Staff

## 2017-08-02 NOTE — ED NOTES
Pt was unable to full awake. Pt would mumble and will not open his eyes for RN. Dr. Magen Parkinson is aware. Pt  Cannot ambulate at this time.

## 2017-08-02 NOTE — DISCHARGE INSTRUCTIONS
Dizziness: Care Instructions  Your Care Instructions  Dizziness is the feeling of unsteadiness or fuzziness in your head. It is different than having vertigo, which is a feeling that the room is spinning or that you are moving or falling. It is also different from lightheadedness, which is the feeling that you are about to faint. It can be hard to know what causes dizziness. Some people feel dizzy when they have migraine headaches. Sometimes bouts of flu can make you feel dizzy. Some medical conditions, such as heart problems or high blood pressure, can make you feel dizzy. Many medicines can cause dizziness, including medicines for high blood pressure, pain, or anxiety. If a medicine causes your symptoms, your doctor may recommend that you stop or change the medicine. If it is a problem with your heart, you may need medicine to help your heart work better. If there is no clear reason for your symptoms, your doctor may suggest watching and waiting for a while to see if the dizziness goes away on its own. Follow-up care is a key part of your treatment and safety. Be sure to make and go to all appointments, and call your doctor if you are having problems. It's also a good idea to know your test results and keep a list of the medicines you take. How can you care for yourself at home? · If your doctor recommends or prescribes medicine, take it exactly as directed. Call your doctor if you think you are having a problem with your medicine. · Do not drive while you feel dizzy. · Try to prevent falls. Steps you can take include:  ¨ Using nonskid mats, adding grab bars near the tub, and using night-lights. ¨ Clearing your home so that walkways are free of anything you might trip on. ¨ Letting family and friends know that you have been feeling dizzy. This will help them know how to help you. When should you call for help? Call 911 anytime you think you may need emergency care.  For example, call if:  · You passed out (lost consciousness). · You have dizziness along with symptoms of a heart attack. These may include:  ¨ Chest pain or pressure, or a strange feeling in the chest.  ¨ Sweating. ¨ Shortness of breath. ¨ Nausea or vomiting. ¨ Pain, pressure, or a strange feeling in the back, neck, jaw, or upper belly or in one or both shoulders or arms. ¨ Lightheadedness or sudden weakness. ¨ A fast or irregular heartbeat. · You have symptoms of a stroke. These may include:  ¨ Sudden numbness, tingling, weakness, or loss of movement in your face, arm, or leg, especially on only one side of your body. ¨ Sudden vision changes. ¨ Sudden trouble speaking. ¨ Sudden confusion or trouble understanding simple statements. ¨ Sudden problems with walking or balance. ¨ A sudden, severe headache that is different from past headaches. Call your doctor now or seek immediate medical care if:  · You feel dizzy and have a fever, headache, or ringing in your ears. · You have new or increased nausea and vomiting. · Your dizziness does not go away or comes back. Watch closely for changes in your health, and be sure to contact your doctor if:  · You do not get better as expected. Where can you learn more? Go to http://agueda-matthew.info/. Enter D407 in the search box to learn more about \"Dizziness: Care Instructions. \"  Current as of: March 20, 2017  Content Version: 11.3  © 1666-1466 [email or web address]. Care instructions adapted under license by [employer] (which disclaims liability or warranty for this information). If you have questions about a medical condition or this instruction, always ask your healthcare professional. [place] [address] any warranty or liability for your use of this information. Vertigo: Care Instructions  Your Care Instructions  Vertigo is the feeling that you or your surroundings are moving when there is no actual movement.  It is often described as a feeling of spinning, whirling, falling, or tilting. Vertigo may make you vomit or feel nauseated. You may have trouble standing or walking and may lose your balance. Vertigo is often related to an inner ear problem, but it can have other more serious causes. If vertigo continues, you may need more tests to find its cause. Follow-up care is a key part of your treatment and safety. Be sure to make and go to all appointments, and call your doctor if you are having problems. Its also a good idea to know your test results and keep a list of the medicines you take. How can you care for yourself at home? · Do not lie flat on your back. Prop yourself up slightly. This may reduce the spinning feeling. Keep your eyes open. · Move slowly so that you do not fall. · If your doctor recommends medicine, take it exactly as directed. · Do not drive while you are having vertigo. Certain exercises, called Mendez-Daroff exercises, can help decrease vertigo. To do Mendez-Daroff exercises:  · Sit on the edge of a bed or sofa and quickly lie down on the side that causes the worst vertigo. Lie on your side with your ear down. · Stay in this position for at least 30 seconds or until the vertigo goes away. · Sit up. If this causes vertigo, wait for it to stop. · Repeat the procedure on the other side. · Repeat this 10 times. Do these exercises 2 times a day until the vertigo is gone. When should you call for help? Call 911 anytime you think you may need emergency care. For example, call if:  · You passed out (lost consciousness). · You have symptoms of a stroke. These may include:  ¨ Sudden numbness, tingling, weakness, or loss of movement in your face, arm, or leg, especially on only one side of your body. ¨ Sudden vision changes. ¨ Sudden trouble speaking. ¨ Sudden confusion or trouble understanding simple statements. ¨ Sudden problems with walking or balance.   ¨ A sudden, severe headache that is different from past headaches. Call your doctor now or seek immediate medical care if:  · Vertigo occurs with a fever, a headache, or ringing in your ears. · You have new or increased nausea and vomiting. Watch closely for changes in your health, and be sure to contact your doctor if:  · Vertigo gets worse or happens more often. · Vertigo has not gotten better after 2 weeks. Where can you learn more? Go to http://agueda-matthew.info/. Enter V846 in the search box to learn more about \"Vertigo: Care Instructions. \"  Current as of: July 29, 2016  Content Version: 11.3  © 4367-8562 [employer]. Care instructions adapted under license by [employer] (which disclaims liability or warranty for this information). If you have questions about a medical condition or this instruction, always ask your healthcare professional. [address] any warranty or liability for your use of this information.

## 2017-08-02 NOTE — ED NOTES
Dr. Heather Ramos spoke to Pranav orourke from [address]  Pt will be sent back to the ER and Villa sharad from MRI states \"we tried. \" Charge nurse is aware.

## 2017-08-02 NOTE — ED NOTES
Pt is now sleeping. Has asked for a small snack as he states he is neither eating or sleeping well. Family member has left to go home to  take his medications and will return or send another family member around 8-8:30 to talk to the . Pt is calmer since med administration.  AB

## 2017-08-02 NOTE — ED NOTES
Report and care to Sarthak [place] and [place]. PT remains asleep. Beltran Lynn nephew called and states he will not be able to come back as soon as he had thought. Provided some information on the pt's medications but is unsure how compliant he has been with his BP meds. Gives another contact as Kelly Cisse at [phone]. Beltran Lynn can be reached at [phone].  AB

## 2017-08-02 NOTE — ED NOTES
Pt states no pain but \"I feel like I am dying. \" He is confused to year. Knows he is in the hospital but can not state which one. States he lives alone and \"I just feel like I am going crazy. \" Describes a feeling of dizziness and c/o inability to walk. Family member Derandall Flo, nephew states he is afraid for the pt's safety at home as he lives alone.

## 2017-08-02 NOTE — PROGRESS NOTES
To room to speak with patient / lights out, patient with eyes closed. Per Dr. Mohit Craig, waiting on MRI results / if negative, patient to be discharged. Called and spoke with patient's nephew Celia Xiong [phone]) and he states patient lives alone, no children, never been . Per Perla Armstrong, he has neighbors that assist intermittently. Per Perla Armstrong, he still drives, smokes and is Caitlyn. \"   Per Perla Armstrong, his step-brother Ashley Bashir [phone]) also assists. Per Perla Armstrong, they used to live closer to patient but moved further away which makes it harder for them to be there. Discussed with Perla Armstrong that if MRI results are negative and nothing else if found that meets admit criteria , patient would have to be discharged home. Discussed long term options with Perla Armstrong such as [facility] or caregivers in the home, making him aware that these are both an out of pocket expense. CM will give resources for both to the family. Per Perla Armstrong, if patient gets put up for discharge, he or Diane Graham will come  patient.

## 2017-08-02 NOTE — ED NOTES
Pt back in room and is currently asleep snoring. Pt is not combative, nor is he alert. Pt is able to respond to voice and pain. Charge nurse and Dr. Issac Aguirre is aware of pts current status of being asleep and not combative at this time. MRI sent pt back due to pt being combative. No signs of combative or swinging at this time. Pt is currently asleep with call light within reach. Door open, bed rail up, lights on, continuous monitor.

## 2017-08-03 NOTE — PROGRESS NOTES
CC scheduled third outreach attempt follow-up call in 5 days. This note will not be viewable in [address]

## 2017-08-03 NOTE — PROGRESS NOTES
CC attempted to contact patient @ [phone]. Number is [phone] [facility]. Outreach attempt is unsuccessful. Will attempt second outreach in 24 hours. This note will not be viewable in [address]

## 2017-08-03 NOTE — PROGRESS NOTES
CC contacted PCP's office in regards to scheduling a follow-up appt. CC was able to schedule hospital follow-up up 8/10/17 @ 12p at [address] with Dr. Robe Lundberg.     Staff member agreed to contact patient and notify patient of appt date/time. This note will not be viewable in [address]

## 2017-09-12 NOTE — Clinical Note
See note. Not sure we are going to be able to do much here as no one in the family seems willing to assist, but we will try. Bartolo Lopez will go with you if you get a home visit scheduled.

## 2017-09-12 NOTE — PROGRESS NOTES
Referral from Dr Angel Parish to arrange follow up. Call to [facility] and spoke to [facility]. Appointment made for tomorrow 9/13 @ 2:15. Call to patient at number listed on face sheet and got [facility]. Call to emergency contact Bonny Huber ([phone]) - message left. Call to Aleah Zambrano who is another contact listed in his chart ([phone]) who gave me patient's home number ([phone]). Called number and got no answer - voicemail has not been set up so I could not leave a message.

## 2017-09-12 NOTE — PROGRESS NOTES
Referral received from ED CM who states has been unable to contact family or patient to inform of appointment at [address] for 9/13 at 2:15.    1. Call to Mr. Freya Nunn number: [phone]. No answer, no voice mailbox. 2. Call to Froylan Naranjo, nephew, who states he is at work. Froylan Naranjo states his uncle lives alone, has no spouse or children to help with caregiving. Froylan Naranjo has just started a new job and cannot leave during the day to assist his uncle. CM explained to Froylan Naranjo that the family will need to meet and discuss future care for Mr. Millicent Aguayo. Offered assistance of [facility] to complete POA and Medicaid application for future living assistance. Cm also asked Galeano Marcella to notify Mr. Millicent Aguayo of his appointment. Froylan Naranjo asked CM to call his brother, Martín Johnston to get message to Mr. Millicent Aguayo. [address] called at number provided by Froylan Naranjo: [phone]. This line answered by a female who states, \"this is [address] phone and don't call it. \" This person refused to identify herself, so no message left. Plan: Refer to [facility] to assist with POA and Medicaid application if patient agrees.

## 2017-09-12 NOTE — PROGRESS NOTES
No call from caryn Jimenez Notice - attempted to call again and still no answer. Call to [facility] again - left message. Call to other contact in file, Aaron Toney, ([phone]) - message left.

## 2017-09-12 NOTE — ED TRIAGE NOTES
Pt presents to ER for poss AMS after police were called to the [employer] station after pt has been there for almost 6 hrs. Pt states that he drove to the gas station but forgot his money, didn't leave at that time and had no one else to call. Pt is able to answer questions appropriately though has a echolalic-type questioning at times. BGL was 113, VSS while en route.

## 2017-09-12 NOTE — ED NOTES
I have reviewed discharge instructions with the patient. The patient verbalized understanding. Pt waiting in the [facility] provided cab to take him home.

## 2017-09-12 NOTE — PROGRESS NOTES
Call to patient's home number again and got no answer. Left another message with both Brittani Garcia and Marlyn Infatne.

## 2017-09-12 NOTE — DISCHARGE INSTRUCTIONS
Gastrointestinal Bleeding: Care Instructions  Your Care Instructions    The digestive or gastrointestinal tract goes from the mouth to the anus. It is often called the GI tract. Bleeding can happen anywhere in the GI tract. It may be caused by an ulcer, an infection, or cancer. It may also be caused by medicines such as aspirin or ibuprofen. Light bleeding may not cause any symptoms at first. But if you continue to bleed for a while, you may feel very weak or tired. Sudden, heavy bleeding means you need to see a doctor right away. This kind of bleeding can be very dangerous. But it can usually be cured or controlled. The doctor may do some tests to find the cause of your bleeding. Follow-up care is a key part of your treatment and safety. Be sure to make and go to all appointments, and call your doctor if you are having problems. It's also a good idea to know your test results and keep a list of the medicines you take. How can you care for yourself at home? · Be safe with medicines. Take your medicines exactly as prescribed. Call your doctor if you think you are having a problem with your medicine. You will get more details on the specific medicines your doctor prescribes. · Do not take aspirin or other anti-inflammatory medicines, such as naproxen (Aleve) or ibuprofen (Advil, Motrin), without talking to your doctor first. Ask your doctor if it is okay to use acetaminophen (Tylenol). · Do not drink alcohol. · The bleeding may make you lose iron. So it's important to eat foods that have a lot of iron. These include red meat, shellfish, poultry, and eggs. They also include beans, raisins, whole-grain breads, and leafy green vegetables. If you want help planning meals, you can make an appointment with a dietitian. When should you call for help? Call 911 anytime you think you may need emergency care. For example, call if:  · You have sudden, severe belly pain.   · You vomit blood or what looks like coffee grounds. · You passed out (lost consciousness). · Your stools are maroon or very bloody. Call your doctor now or seek immediate medical care if:  · You are dizzy or lightheaded, or you feel like you may faint. · Your stools are black and look like tar, or they have streaks of blood. · You have belly pain. · You vomit or have nausea. · You have trouble swallowing, or it hurts when you swallow. Watch closely for changes in your health, and be sure to contact your doctor if:  · You do not get better as expected. Where can you learn more? Go to http://agueda-matthew.info/. Enter P691 in the search box to learn more about \"Gastrointestinal Bleeding: Care Instructions. \"  Current as of: March 20, 2017  Content Version: 11.3  © 9603-4922 [employer]. Care instructions adapted under license by [employer] (which disclaims liability or warranty for this information). If you have questions about a medical condition or this instruction, always ask your healthcare professional. [place] [address] any warranty or liability for your use of this information. Anemia: Care Instructions  Your Care Instructions    Anemia is a low level of red blood cells, which carry oxygen throughout your body. Many things can cause anemia. Lack of iron is one of the most common causes. Your body needs iron to make hemoglobin, a substance in red blood cells that carries oxygen from the lungs to your body's cells. Without enough iron, the body produces fewer and smaller red blood cells. As a result, your body's cells do not get enough oxygen, and you feel tired and weak. And you may have trouble concentrating. Bleeding is the most common cause of a lack of iron. You may have heavy menstrual bleeding or bleeding caused by conditions such as ulcers, hemorrhoids, or cancer.  Regular use of aspirin or other anti-inflammatory medicines (such as ibuprofen) also can cause bleeding in some people. A lack of iron in your diet also can cause anemia, especially at times when the body needs more iron, such as during pregnancy, infancy, and the teen years. Your doctor may have prescribed iron pills. It may take several months of treatment for your iron levels to return to normal. Your doctor also may suggest that you eat foods that are rich in iron, such as meat and beans. There are many other causes of anemia. It is not always due to a lack of iron. Finding the specific cause of your anemia will help your doctor find the right treatment for you. Follow-up care is a key part of your treatment and safety. Be sure to make and go to all appointments, and call your doctor if you are having problems. It's also a good idea to know your test results and keep a list of the medicines you take. How can you care for yourself at home? · Take your medicines exactly as prescribed. Call your doctor if you think you are having a problem with your medicine. · If your doctor recommends iron pills, take them as directed:  ¨ Try to take the pills on an empty stomach about 1 hour before or 2 hours after meals. But you may need to take iron with food to avoid an upset stomach. ¨ Do not take antacids or drink milk or caffeine drinks (such as coffee, tea, or cola) at the same time or within 2 hours of the time that you take your iron. They can make it hard for your body to absorb the iron. ¨ Vitamin C (from food or supplements) helps your body absorb iron. Try taking iron pills with a glass of orange juice or some other food that is high in vitamin C, such as citrus fruits. ¨ Iron pills may cause stomach problems, such as heartburn, nausea, diarrhea, constipation, and cramps. Be sure to drink plenty of fluids, and include fruits, vegetables, and fiber in your diet each day. Iron pills often make your bowel movements dark or green.   ¨ If you forget to take an iron pill, do not take a double dose of iron the next time you take a pill. ¨ Keep iron pills out of the reach of small children. An overdose of iron can be very dangerous. · Follow your doctor's advice about eating iron-rich foods. These include red meat, shellfish, poultry, eggs, beans, raisins, whole-grain bread, and leafy green vegetables. · Steam vegetables to help them keep their iron content. When should you call for help? Call 911 anytime you think you may need emergency care. For example, call if:  · You have symptoms of a heart attack. These may include:  ¨ Chest pain or pressure, or a strange feeling in the chest.  ¨ Sweating. ¨ Shortness of breath. ¨ Nausea or vomiting. ¨ Pain, pressure, or a strange feeling in the back, neck, jaw, or upper belly or in one or both shoulders or arms. ¨ Lightheadedness or sudden weakness. ¨ A fast or irregular heartbeat. After you call 911, the  may tell you to chew 1 adult-strength or 2 to 4 low-dose aspirin. Wait for an ambulance. Do not try to drive yourself. · You passed out (lost consciousness). Call your doctor now or seek immediate medical care if:  · You have new or increased shortness of breath. · You are dizzy or lightheaded, or you feel like you may faint. · Your fatigue and weakness continue or get worse. · You have any abnormal bleeding, such as:  ¨ Nosebleeds. ¨ Vaginal bleeding that is different (heavier, more frequent, at a different time of the month) than what you are used to. ¨ Bloody or black stools, or rectal bleeding. ¨ Bloody or pink urine. Watch closely for changes in your health, and be sure to contact your doctor if:  · You do not get better as expected. Where can you learn more? Go to http://agueda-matthew.info/. Enter R301 in the search box to learn more about \"Anemia: Care Instructions. \"  Current as of: October 13, 2016  Content Version: 11.3  © 8944-9529 [employer].  Care instructions adapted under license by [facility] (which disclaims liability or warranty for this information). If you have questions about a medical condition or this instruction, always ask your healthcare professional. [place] [address] any warranty or liability for your use of this information. Well Visit, Over 72: Care Instructions  Your Care Instructions  Physical exams can help you stay healthy. Your doctor has checked your overall health and may have suggested ways to take good care of yourself. He or she also may have recommended tests. At home, you can help prevent illness with healthy eating, regular exercise, and other steps. Follow-up care is a key part of your treatment and safety. Be sure to make and go to all appointments, and call your doctor if you are having problems. It's also a good idea to know your test results and keep a list of the medicines you take. How can you care for yourself at home? · Reach and stay at a healthy weight. This will lower your risk for many problems, such as obesity, diabetes, heart disease, and high blood pressure. · Get at least 30 minutes of exercise on most days of the week. Walking is a good choice. You also may want to do other activities, such as running, swimming, cycling, or playing tennis or team sports. · Do not smoke. Smoking can make health problems worse. If you need help quitting, talk to your doctor about stop-smoking programs and medicines. These can increase your chances of quitting for good. · Protect your skin from too much sun. When you're outdoors from 10 a.m. to 4 p.m., stay in the shade or cover up with clothing and a hat with a wide brim. Wear sunglasses that block UV rays. Even when it's cloudy, put broad-spectrum sunscreen (SPF 30 or higher) on any exposed skin. · See a dentist one or two times a year for checkups and to have your teeth cleaned. · Wear a seat belt in the car. · Limit alcohol to 2 drinks a day for men and 1 drink a day for women.  Too much alcohol can cause health problems. Follow your doctor's advice about when to have certain tests. These tests can spot problems early. For men and women  · Cholesterol. Your doctor will tell you how often to have this done based on your overall health and other things that can increase your risk for heart attack and stroke. · Blood pressure. Have your blood pressure checked during a routine doctor visit. Your doctor will tell you how often to check your blood pressure based on your age, your blood pressure results, and other factors. · Diabetes. Ask your doctor whether you should have tests for diabetes. · Vision. Experts recommend that you have yearly exams for glaucoma and other age-related eye problems. · Hearing. Tell your doctor if you notice any change in your hearing. You can have tests to find out how well you hear. · Colon cancer tests. Keep having colon cancer tests as your doctor recommends. You can have one of several types of tests. · Heart attack and stroke risk. At least every 4 to 6 years, you should have your risk for heart attack and stroke assessed. Your doctor uses factors such as your age, blood pressure, cholesterol, and whether you smoke or have diabetes to show what your risk for a heart attack or stroke is over the next 10 years. · Osteoporosis. Talk to your doctor about whether you should have a bone density test to find out whether you have thinning bones. Also ask your doctor about whether you should take calcium and vitamin D supplements. For women  · Pap test and pelvic exam. You may no longer need a Pap test. Talk with your doctor about whether to stop or continue to have Pap tests. · Breast exam and mammogram. Ask how often you should have a mammogram, which is an X-ray of your breasts. A mammogram can spot breast cancer before it can be felt and when it is easiest to treat. · Thyroid disease.  Talk to your doctor about whether to have your thyroid checked as part of a regular physical exam. Women have an increased chance of a thyroid problem. For men  · Prostate exam. Talk to your doctor about whether you should have a blood test (called a PSA test) for prostate cancer. Experts disagree on whether men should have this test. Some experts recommend that you discuss the benefits and risks of the test with your doctor. · Abdominal aortic aneurysm. Ask your doctor whether you should have a test to check for an aneurysm. You may need a test if you ever smoked or if your parent, brother, sister, or child has had an aneurysm. When should you call for help? Watch closely for changes in your health, and be sure to contact your doctor if you have any problems or symptoms that concern you. Where can you learn more? Go to http://agueda-matthew.info/. Enter O428 in the search box to learn more about \"Well Visit, Over 65: Care Instructions. \"  Current as of: July 19, 2016  Content Version: 11.3  © 9780-6812 [employer]. Care instructions adapted under license by [employer] (which disclaims liability or warranty for this information). If you have questions about a medical condition or this instruction, always ask your healthcare professional. [address] any warranty or liability for your use of this information.
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
## 2017-09-13 NOTE — PROGRESS NOTES
Attempted to call patient at home and was unable to reach him. Left another message for nephew Almer Cabot. Also called [facility] and could not reach them. Call to [address] and spoke to Sundeep pisano -  cancelled appointment until I can reach someone.

## 2017-09-13 NOTE — PROGRESS NOTES
Social Work Cm outreached to pt's nephew again for f/u re: home visit. Natalie Greshamia, advised he spoke with pt yesterday and got some updates on ED visit and was able to discuss CM's thoughts on doing a visit to discuss need for POA and/or support in the home. Alejandro De La Torre advised that pt is not willing for CM's to do a visit at this time and is in a bit of \"denial\" about his confusion and forgetfulness. Luis Carlos's mother, pt's sister in law, is willing to meet but would like pt to be agreeable as well. Alejandro De La Torre did share that pt said he may be willing to allow an aide to be hired to check on him possibly. MERLE and Alejandro De La Torre agreed for CM to check back in one week to see if pt waivers. If not, CM will possibly supply a blank POA form as a minimum and then advise on how to complete. CM also advised can get a home aide company to come out to meet with pt/family to consult on assistance in the home. PIERO BLOOM updated RN CM. Next outreach on 9/20. This note will not be viewable in [address]

## 2017-09-13 NOTE — PROGRESS NOTES
Social Work CM received call back from pt's nephew, Bonny Huber, yesterday evening. CM introduced self and explained role as well as concerns with pt's care and memory. Cm stressed needing a few family members, if able, to be aware of certain concerns and a support if possible. Bonny Huber was unaware pt went to ED on Monday but did agree that pt has \"declined quickly\" from what little he knows. Bonny Huber reports that pt is his fathers brother (nephews father has passed) and that pt's sister in law, Sammy Mejia mother, would most likely be willing to meet with pt, Bonny Hubre, and CM's. Bonny Huber was going to check with his mother as well as his work to come up with some times he would be able to attend a home visit. CM planned to call pt today to inquire what was discussed, had to leave a vm. CM requested returned call. If CM misses call due to meeting, will be in touch and coordinate asap. RN CM updated. This note will not be viewable in [address]
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
## 2017-10-09 NOTE — PROGRESS NOTES
PIERO BLOOM made aware of update via RN CM re: pt caregiver status and PCP needs. PIERO CM removing from caseload and will not plan any further outreaches at this time. This note will not be viewable in [address]

## 2017-10-09 NOTE — PROGRESS NOTES
Follow-up call to verify PCP visit completed and assess for new issues. SPoke with Desire Sung. [facility] is living with Mr. Azra Hernández and taking care of his needs, including his medications. He reports Mr. Azra Hernández is no longer hesitant to take a shower with [facility] there to help him in and out. He also reports Mr. Azra Hernández is eating well. Mr. Azra Hernández has seen his PCA, Dr. Nadege Dobson in [facility]. He was given a Rx for triazolam for sleep. [facility] reports on days when Mr. Azra Hernández has had a full night's sleep, he shows no signs of dementia. However, when he has had little sleep, he is often confused over time, date, place. [facility] reports Dr. Nadege Dobson gave Mr. Azra Hernández a good report and he returns in 6 months for a check-up. [facility] states he will call cardiology for an appointment. No further CM issues; case closed.
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
## 2017-11-27 NOTE — PROGRESS NOTES
Spoke with David Gonzalez ([phone]) who states that he is the patient's POA. He states that when Mr. Maria C Bowers doesn't sleep well he acts out the next day. He states that the patient was prescribed something by Dr. Gilbert Pabon his PCP but Marek Ellis remember what it was. He states that it worked. He also states that they last time he tried to get it filled the insurance would not cover, but he states today that he is willing to pay for it out of pocket. He states that he does have someone living with the patient acting as caretakers.       Troy Carrero states that he is on his way to the hospital.
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
## 2017-11-27 NOTE — ED NOTES
Per Dr Lianna Herrera patient will stay in our care till morning. Patients POA reports that he has stayed awake for days without his sleeping meds. Says he is \"usually normal in the morning after sleeping. \"
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
## 2017-11-28 NOTE — DISCHARGE INSTRUCTIONS
Insomnia: Care Instructions  Your Care Instructions    Insomnia is the inability to sleep well. It is a common problem for most people at some time. Insomnia may make it hard for you to get to sleep, stay asleep, or sleep as long as you need to. This can make you tired and grouchy during the day. It can also make you forgetful, less effective at work, and unhappy. Insomnia can be caused by conditions such as depression or anxiety. Pain can also affect your ability to sleep. When these problems are solved, the insomnia usually clears up. But sometimes bad sleep habits can cause insomnia. If insomnia is affecting your work or your enjoyment of life, you can take steps to improve your sleep. Follow-up care is a key part of your treatment and safety. Be sure to make and go to all appointments, and call your doctor if you are having problems. It's also a good idea to know your test results and keep a list of the medicines you take. How can you care for yourself at home? What to avoid  · Do not have drinks with caffeine, such as coffee or black tea, for 8 hours before bed. · Do not smoke or use other types of tobacco near bedtime. Nicotine is a stimulant and can keep you awake. · Avoid drinking alcohol late in the evening, because it can cause you to wake in the middle of the night. · Do not eat a big meal close to bedtime. If you are hungry, eat a light snack. · Do not drink a lot of water close to bedtime, because the need to urinate may wake you up during the night. · Do not read or watch TV in bed. Use the bed only for sleeping and sexual activity. What to try  · Go to bed at the same time every night, and wake up at the same time every morning. Do not take naps during the day. · Keep your bedroom quiet, dark, and cool. · Sleep on a comfortable pillow and mattress. · If watching the clock makes you anxious, turn it facing away from you so you cannot see the time.   · If you worry when you lie down, start a worry book. Well before bedtime, write down your worries, and then set the book and your concerns aside. · Try meditation or other relaxation techniques before you go to bed. · If you cannot fall asleep, get up and go to another room until you feel sleepy. Do something relaxing. Repeat your bedtime routine before you go to bed again. · Make your house quiet and calm about an hour before bedtime. Turn down the lights, turn off the TV, log off the computer, and turn down the volume on music. This can help you relax after a busy day. When should you call for help? Watch closely for changes in your health, and be sure to contact your doctor if:  ? · Your efforts to improve your sleep do not work. ? · Your insomnia gets worse. ? · You have been feeling down, depressed, or hopeless or have lost interest in things that you usually enjoy. Where can you learn more? Go to http://aguedaVigilosmatthew.info/. Enter P513 in the search box to learn more about \"Insomnia: Care Instructions. \"  Current as of: July 26, 2016  Content Version: 11.4  © 6946-9713 [employer]. Care instructions adapted under license by [employer] (which disclaims liability or warranty for this information). If you have questions about a medical condition or this instruction, always ask your healthcare professional. [address] any warranty or liability for your use of this information. Learning About Sleeping Well  What does sleeping well mean? Sleeping well means getting enough sleep. How much sleep is enough varies among people. The number of hours you sleep is not as important as how you feel when you wake up. If you do not feel refreshed, you probably need more sleep. Another sign of not getting enough sleep is feeling tired during the day. The average total nightly sleep time is 7½ to 8 hours.  Healthy adults may need a little more or a little less than this.  Why is getting enough sleep important? Getting enough quality sleep is a basic part of good health. When your sleep suffers, your mood and your thoughts can suffer too. You may find yourself feeling more grumpy or stressed. Not getting enough sleep also can lead to serious problems, including injury, accidents, anxiety, and depression. What might cause poor sleeping? Many things can cause sleep problems, including:  · Stress. Stress can be caused by fear about a single event, such as giving a speech. Or you may have ongoing stress, such as worry about work or school. · Depression, anxiety, and other mental or emotional conditions. · Changes in your sleep habits or surroundings. This includes changes that happen where you sleep, such as noise, light, or sleeping in a different bed. It also includes changes in your sleep pattern, such as having jet lag or working a late shift. · Health problems, such as pain, breathing problems, and restless legs syndrome. · Lack of regular exercise. How can you help yourself? Here are some tips that may help you sleep more soundly and wake up feeling more refreshed. Your sleeping area  · Use your bedroom only for sleeping and sex. A bit of light reading may help you fall asleep. But if it doesn't, do your reading elsewhere in the house. Don't watch TV in bed. · Be sure your bed is big enough to stretch out comfortably, especially if you have a sleep partner. · Keep your bedroom quiet, dark, and cool. Use curtains, blinds, or a sleep mask to block out light. To block out noise, use earplugs, soothing music, or a \"white noise\" machine. Your evening and bedtime routine  · Create a relaxing bedtime routine. You might want to take a warm shower or bath, listen to soothing music, or drink a cup of noncaffeinated tea. · Go to bed at the same time every night. And get up at the same time every morning, even if you feel tired.   What to avoid  · Limit caffeine (coffee, tea, caffeinated sodas) during the day, and don't have any for at least 4 to 6 hours before bedtime. · Don't drink alcohol before bedtime. Alcohol can cause you to wake up more often during the night. · Don't smoke or use tobacco, especially in the evening. Nicotine can keep you awake. · Don't take naps during the day, especially close to bedtime. · Don't lie in bed awake for too long. If you can't fall asleep, or if you wake up in the middle of the night and can't get back to sleep within 15 minutes or so, get out of bed and go to another room until you feel sleepy. · Don't take medicine right before bed that may keep you awake or make you feel hyper or energized. Your doctor can tell you if your medicine may do this and if you can take it earlier in the day. If you can't sleep  · Imagine yourself in a peaceful, pleasant scene. Focus on the details and feelings of being in a place that is relaxing. · Get up and do a quiet or boring activity until you feel sleepy. · Don't drink any liquids after 6 p.m. if you wake up often because you have to go to the bathroom. Where can you learn more? Go to http://agueda-matthew.info/. Enter D684 in the search box to learn more about \"Learning About Sleeping Well. \"  Current as of: May 12, 2017  Content Version: 11.4  © 0128-3653 [employer]. Care instructions adapted under license by [employer] (which disclaims liability or warranty for this information). If you have questions about a medical condition or this instruction, always ask your healthcare professional. [place] [address] any warranty or liability for your use of this information.
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
## 2017-11-28 NOTE — ED NOTES
I have reviewed discharge instructions with the caregiver. The caregiver verbalized understanding. Patient left ED via Discharge Method: wheelchair to Home with family/caregiver. Opportunity for questions and clarification provided. Patient given 1 scripts. To continue your aftercare when you leave the hospital, you may receive an automated call from our care team to check in on how you are doing. This is a free service and part of our promise to provide the best care and service to meet your aftercare needs.  If you have questions, or wish to unsubscribe from this service please call [phone]. Thank you for Choosing our [facility] Emergency Department.

## 2017-11-28 NOTE — ED NOTES
Steph, care management, was at bedside with caregiver. pts caregiver currently speaking to Dr. Elma Walker at this time.

## 2018-01-01 ENCOUNTER — ANESTHESIA (OUTPATIENT)
Dept: ENDOSCOPY | Age: 73
DRG: 287 | End: 2018-01-01
Payer: COMMERCIAL

## 2018-01-01 ENCOUNTER — HOSPITAL ENCOUNTER (INPATIENT)
Age: 73
LOS: 7 days | Discharge: HOME | DRG: 287 | End: 2018-04-12
Attending: INTERNAL MEDICINE | Admitting: INTERNAL MEDICINE
Payer: COMMERCIAL

## 2018-01-01 ENCOUNTER — HOME HEALTH ADMISSION (OUTPATIENT)
Dept: HOME HEALTH SERVICES | Facility: HOME HEALTH | Age: 73
End: 2018-01-01

## 2018-01-01 ENCOUNTER — APPOINTMENT (OUTPATIENT)
Dept: CT IMAGING | Age: 73
DRG: 287 | End: 2018-01-01
Attending: HOSPITALIST
Payer: COMMERCIAL

## 2018-01-01 ENCOUNTER — ANESTHESIA EVENT (OUTPATIENT)
Dept: ENDOSCOPY | Age: 73
DRG: 287 | End: 2018-01-01
Payer: COMMERCIAL

## 2018-01-01 ENCOUNTER — APPOINTMENT (OUTPATIENT)
Dept: GENERAL RADIOLOGY | Age: 73
DRG: 287 | End: 2018-01-01
Attending: PHYSICIAN ASSISTANT
Payer: COMMERCIAL

## 2018-01-01 VITALS
DIASTOLIC BLOOD PRESSURE: 61 MMHG | BODY MASS INDEX: 26.23 KG/M2 | TEMPERATURE: 98 F | SYSTOLIC BLOOD PRESSURE: 108 MMHG | WEIGHT: 177.6 LBS | OXYGEN SATURATION: 96 % | HEART RATE: 69 BPM | RESPIRATION RATE: 18 BRPM

## 2018-01-01 DIAGNOSIS — R06.02 SOB (SHORTNESS OF BREATH): Primary | ICD-10-CM

## 2018-01-01 DIAGNOSIS — I35.0 AORTIC VALVE STENOSIS, ETIOLOGY OF CARDIAC VALVE DISEASE UNSPECIFIED: ICD-10-CM

## 2018-01-01 LAB
ABO + RH BLD: NORMAL
ALBUMIN SERPL-MCNC: 2.9 G/DL (ref 3.2–4.6)
ALBUMIN/GLOB SERPL: 0.8 {RATIO} (ref 1.2–3.5)
ALP SERPL-CCNC: 57 U/L (ref 50–136)
ALT SERPL-CCNC: 14 U/L (ref 12–65)
ANION GAP SERPL CALC-SCNC: 7 MMOL/L (ref 7–16)
ANION GAP SERPL CALC-SCNC: 7 MMOL/L (ref 7–16)
ANION GAP SERPL CALC-SCNC: 8 MMOL/L (ref 7–16)
ANION GAP SERPL CALC-SCNC: 9 MMOL/L (ref 7–16)
APPEARANCE UR: CLEAR
AST SERPL-CCNC: 11 U/L (ref 15–37)
ATRIAL RATE: 68 BPM
BASOPHILS # BLD: 0 K/UL (ref 0–0.2)
BASOPHILS NFR BLD: 0 % (ref 0–2)
BASOPHILS NFR BLD: 1 % (ref 0–2)
BASOPHILS NFR BLD: 1 % (ref 0–2)
BILIRUB SERPL-MCNC: 0.3 MG/DL (ref 0.2–1.1)
BILIRUB UR QL: NEGATIVE
BLD PROD TYP BPU: NORMAL
BLOOD GROUP ANTIBODIES SERPL: NORMAL
BNP SERPL-MCNC: 1510 PG/ML
BPU ID: NORMAL
BUN SERPL-MCNC: 18 MG/DL (ref 8–23)
BUN SERPL-MCNC: 21 MG/DL (ref 8–23)
BUN SERPL-MCNC: 27 MG/DL (ref 8–23)
BUN SERPL-MCNC: 38 MG/DL (ref 8–23)
BUN SERPL-MCNC: 46 MG/DL (ref 8–23)
BUN SERPL-MCNC: 54 MG/DL (ref 8–23)
CALCIUM SERPL-MCNC: 8.1 MG/DL (ref 8.3–10.4)
CALCIUM SERPL-MCNC: 8.1 MG/DL (ref 8.3–10.4)
CALCIUM SERPL-MCNC: 8.3 MG/DL (ref 8.3–10.4)
CALCIUM SERPL-MCNC: 8.5 MG/DL (ref 8.3–10.4)
CALCIUM SERPL-MCNC: 8.5 MG/DL (ref 8.3–10.4)
CALCIUM SERPL-MCNC: 8.7 MG/DL (ref 8.3–10.4)
CALCULATED P AXIS, ECG09: 50 DEGREES
CALCULATED R AXIS, ECG10: -49 DEGREES
CALCULATED T AXIS, ECG11: 98 DEGREES
CHLORIDE SERPL-SCNC: 105 MMOL/L (ref 98–107)
CHLORIDE SERPL-SCNC: 106 MMOL/L (ref 98–107)
CHLORIDE SERPL-SCNC: 108 MMOL/L (ref 98–107)
CHLORIDE SERPL-SCNC: 108 MMOL/L (ref 98–107)
CHLORIDE SERPL-SCNC: 110 MMOL/L (ref 98–107)
CHLORIDE SERPL-SCNC: 111 MMOL/L (ref 98–107)
CO2 SERPL-SCNC: 22 MMOL/L (ref 21–32)
CO2 SERPL-SCNC: 24 MMOL/L (ref 21–32)
CO2 SERPL-SCNC: 24 MMOL/L (ref 21–32)
CO2 SERPL-SCNC: 25 MMOL/L (ref 21–32)
CO2 SERPL-SCNC: 26 MMOL/L (ref 21–32)
CO2 SERPL-SCNC: 27 MMOL/L (ref 21–32)
COLOR UR: YELLOW
CREAT SERPL-MCNC: 1.08 MG/DL (ref 0.8–1.5)
CREAT SERPL-MCNC: 1.17 MG/DL (ref 0.8–1.5)
CREAT SERPL-MCNC: 1.31 MG/DL (ref 0.8–1.5)
CREAT SERPL-MCNC: 1.57 MG/DL (ref 0.8–1.5)
CREAT SERPL-MCNC: 1.77 MG/DL (ref 0.8–1.5)
CREAT SERPL-MCNC: 1.85 MG/DL (ref 0.8–1.5)
CROSSMATCH RESULT,%XM: NORMAL
DIAGNOSIS, 93000: NORMAL
DIFFERENTIAL METHOD BLD: ABNORMAL
EOSINOPHIL # BLD: 0 K/UL (ref 0–0.8)
EOSINOPHIL # BLD: 0.3 K/UL (ref 0–0.8)
EOSINOPHIL # BLD: 0.4 K/UL (ref 0–0.8)
EOSINOPHIL # BLD: 0.5 K/UL (ref 0–0.8)
EOSINOPHIL NFR BLD: 0 % (ref 0.5–7.8)
EOSINOPHIL NFR BLD: 4 % (ref 0.5–7.8)
EOSINOPHIL NFR BLD: 6 % (ref 0.5–7.8)
ERYTHROCYTE [DISTWIDTH] IN BLOOD BY AUTOMATED COUNT: 17.7 % (ref 11.9–14.6)
ERYTHROCYTE [DISTWIDTH] IN BLOOD BY AUTOMATED COUNT: 18.1 % (ref 11.9–14.6)
ERYTHROCYTE [DISTWIDTH] IN BLOOD BY AUTOMATED COUNT: 19 % (ref 11.9–14.6)
ERYTHROCYTE [DISTWIDTH] IN BLOOD BY AUTOMATED COUNT: 19.3 % (ref 11.9–14.6)
ERYTHROCYTE [DISTWIDTH] IN BLOOD BY AUTOMATED COUNT: 19.9 % (ref 11.9–14.6)
ERYTHROCYTE [DISTWIDTH] IN BLOOD BY AUTOMATED COUNT: 20.7 % (ref 11.9–14.6)
ERYTHROCYTE [DISTWIDTH] IN BLOOD BY AUTOMATED COUNT: 20.9 % (ref 11.9–14.6)
FERRITIN SERPL-MCNC: 13 NG/ML (ref 8–388)
FOLATE SERPL-MCNC: 47.8 NG/ML (ref 3.1–17.5)
GLOBULIN SER CALC-MCNC: 3.6 G/DL (ref 2.3–3.5)
GLUCOSE SERPL-MCNC: 100 MG/DL (ref 65–100)
GLUCOSE SERPL-MCNC: 108 MG/DL (ref 65–100)
GLUCOSE SERPL-MCNC: 109 MG/DL (ref 65–100)
GLUCOSE SERPL-MCNC: 88 MG/DL (ref 65–100)
GLUCOSE SERPL-MCNC: 92 MG/DL (ref 65–100)
GLUCOSE SERPL-MCNC: 93 MG/DL (ref 65–100)
GLUCOSE UR STRIP.AUTO-MCNC: NEGATIVE MG/DL
HAPTOGLOB SERPL-MCNC: 205 MG/DL (ref 30–200)
HCT VFR BLD AUTO: 17.2 % (ref 41.1–50.3)
HCT VFR BLD AUTO: 23.3 % (ref 41.1–50.3)
HCT VFR BLD AUTO: 26.6 % (ref 41.1–50.3)
HCT VFR BLD AUTO: 27.3 % (ref 41.1–50.3)
HCT VFR BLD AUTO: 27.3 % (ref 41.1–50.3)
HCT VFR BLD AUTO: 27.5 % (ref 41.1–50.3)
HCT VFR BLD AUTO: 28.1 % (ref 41.1–50.3)
HGB BLD-MCNC: 5.2 G/DL (ref 13.6–17.2)
HGB BLD-MCNC: 7.4 G/DL (ref 13.6–17.2)
HGB BLD-MCNC: 8.1 G/DL (ref 13.6–17.2)
HGB BLD-MCNC: 8.2 G/DL (ref 13.6–17.2)
HGB BLD-MCNC: 8.4 G/DL (ref 13.6–17.2)
HGB BLD-MCNC: 8.4 G/DL (ref 13.6–17.2)
HGB BLD-MCNC: 8.5 G/DL (ref 13.6–17.2)
HGB BLD-MCNC: 8.7 G/DL (ref 13.6–17.2)
HGB RETIC QN AUTO: 16 PG (ref 29–35)
HGB UR QL STRIP: NEGATIVE
IMM GRANULOCYTES # BLD: 0 K/UL (ref 0–0.5)
IMM GRANULOCYTES NFR BLD AUTO: 0 % (ref 0–5)
IMM GRANULOCYTES NFR BLD AUTO: 1 % (ref 0–5)
IMM RETICS NFR: 22.7 % (ref 2.3–13.4)
INR PPP: 1.1
IRON SATN MFR SERPL: 5 %
IRON SERPL-MCNC: 18 UG/DL (ref 35–150)
IRON SERPL-MCNC: 24 UG/DL (ref 35–150)
KETONES UR QL STRIP.AUTO: NEGATIVE MG/DL
LEUKOCYTE ESTERASE UR QL STRIP.AUTO: NEGATIVE
LYMPHOCYTES # BLD: 0.5 K/UL (ref 0.5–4.6)
LYMPHOCYTES # BLD: 0.7 K/UL (ref 0.5–4.6)
LYMPHOCYTES # BLD: 0.8 K/UL (ref 0.5–4.6)
LYMPHOCYTES # BLD: 0.9 K/UL (ref 0.5–4.6)
LYMPHOCYTES # BLD: 0.9 K/UL (ref 0.5–4.6)
LYMPHOCYTES # BLD: 1.1 K/UL (ref 0.5–4.6)
LYMPHOCYTES NFR BLD: 11 % (ref 13–44)
LYMPHOCYTES NFR BLD: 12 % (ref 13–44)
LYMPHOCYTES NFR BLD: 12 % (ref 13–44)
LYMPHOCYTES NFR BLD: 15 % (ref 13–44)
LYMPHOCYTES NFR BLD: 7 % (ref 13–44)
LYMPHOCYTES NFR BLD: 8 % (ref 13–44)
MAGNESIUM SERPL-MCNC: 2.5 MG/DL (ref 1.8–2.4)
MCH RBC QN AUTO: 24 PG (ref 26.1–32.9)
MCH RBC QN AUTO: 25.5 PG (ref 26.1–32.9)
MCH RBC QN AUTO: 25.6 PG (ref 26.1–32.9)
MCH RBC QN AUTO: 25.7 PG (ref 26.1–32.9)
MCH RBC QN AUTO: 25.9 PG (ref 26.1–32.9)
MCH RBC QN AUTO: 26.2 PG (ref 26.1–32.9)
MCH RBC QN AUTO: 26.7 PG (ref 26.1–32.9)
MCHC RBC AUTO-ENTMCNC: 30.2 G/DL (ref 31.4–35)
MCHC RBC AUTO-ENTMCNC: 30.2 G/DL (ref 31.4–35)
MCHC RBC AUTO-ENTMCNC: 30.5 G/DL (ref 31.4–35)
MCHC RBC AUTO-ENTMCNC: 30.8 G/DL (ref 31.4–35)
MCHC RBC AUTO-ENTMCNC: 30.8 G/DL (ref 31.4–35)
MCHC RBC AUTO-ENTMCNC: 31.8 G/DL (ref 31.4–35)
MCHC RBC AUTO-ENTMCNC: 31.9 G/DL (ref 31.4–35)
MCV RBC AUTO: 79.3 FL (ref 79.6–97.8)
MCV RBC AUTO: 80.6 FL (ref 79.6–97.8)
MCV RBC AUTO: 81.3 FL (ref 79.6–97.8)
MCV RBC AUTO: 83.4 FL (ref 79.6–97.8)
MCV RBC AUTO: 84.4 FL (ref 79.6–97.8)
MCV RBC AUTO: 85.7 FL (ref 79.6–97.8)
MCV RBC AUTO: 86.7 FL (ref 79.6–97.8)
MONOCYTES # BLD: 0.3 K/UL (ref 0.1–1.3)
MONOCYTES # BLD: 0.5 K/UL (ref 0.1–1.3)
MONOCYTES # BLD: 0.6 K/UL (ref 0.1–1.3)
MONOCYTES # BLD: 0.8 K/UL (ref 0.1–1.3)
MONOCYTES # BLD: 0.8 K/UL (ref 0.1–1.3)
MONOCYTES # BLD: 0.9 K/UL (ref 0.1–1.3)
MONOCYTES NFR BLD: 11 % (ref 4–12)
MONOCYTES NFR BLD: 11 % (ref 4–12)
MONOCYTES NFR BLD: 4 % (ref 4–12)
MONOCYTES NFR BLD: 7 % (ref 4–12)
MONOCYTES NFR BLD: 9 % (ref 4–12)
MONOCYTES NFR BLD: 9 % (ref 4–12)
NEUTS SEG # BLD: 5.1 K/UL (ref 1.7–8.2)
NEUTS SEG # BLD: 5.3 K/UL (ref 1.7–8.2)
NEUTS SEG # BLD: 5.4 K/UL (ref 1.7–8.2)
NEUTS SEG # BLD: 5.5 K/UL (ref 1.7–8.2)
NEUTS SEG # BLD: 6.6 K/UL (ref 1.7–8.2)
NEUTS SEG # BLD: 6.8 K/UL (ref 1.7–8.2)
NEUTS SEG NFR BLD: 69 % (ref 43–78)
NEUTS SEG NFR BLD: 70 % (ref 43–78)
NEUTS SEG NFR BLD: 72 % (ref 43–78)
NEUTS SEG NFR BLD: 74 % (ref 43–78)
NEUTS SEG NFR BLD: 79 % (ref 43–78)
NEUTS SEG NFR BLD: 89 % (ref 43–78)
NITRITE UR QL STRIP.AUTO: NEGATIVE
P-R INTERVAL, ECG05: 192 MS
PH UR STRIP: 5 [PH] (ref 5–9)
PLATELET # BLD AUTO: 316 K/UL (ref 150–450)
PLATELET # BLD AUTO: 320 K/UL (ref 150–450)
PLATELET # BLD AUTO: 338 K/UL (ref 150–450)
PLATELET # BLD AUTO: 340 K/UL (ref 150–450)
PLATELET # BLD AUTO: 349 K/UL (ref 150–450)
PLATELET # BLD AUTO: 376 K/UL (ref 150–450)
PLATELET # BLD AUTO: 411 K/UL (ref 150–450)
PMV BLD AUTO: 10 FL (ref 10.8–14.1)
PMV BLD AUTO: 10.4 FL (ref 10.8–14.1)
PMV BLD AUTO: 10.5 FL (ref 10.8–14.1)
PMV BLD AUTO: 10.7 FL (ref 10.8–14.1)
PMV BLD AUTO: 9.4 FL (ref 10.8–14.1)
PMV BLD AUTO: 9.5 FL (ref 10.8–14.1)
PMV BLD AUTO: 9.7 FL (ref 10.8–14.1)
POTASSIUM SERPL-SCNC: 3.4 MMOL/L (ref 3.5–5.1)
POTASSIUM SERPL-SCNC: 3.7 MMOL/L (ref 3.5–5.1)
POTASSIUM SERPL-SCNC: 3.8 MMOL/L (ref 3.5–5.1)
POTASSIUM SERPL-SCNC: 4.1 MMOL/L (ref 3.5–5.1)
POTASSIUM SERPL-SCNC: 4.4 MMOL/L (ref 3.5–5.1)
POTASSIUM SERPL-SCNC: 5 MMOL/L (ref 3.5–5.1)
PROT SERPL-MCNC: 6.5 G/DL (ref 6.3–8.2)
PROT UR STRIP-MCNC: NEGATIVE MG/DL
PROTHROMBIN TIME: 14 SEC (ref 11.5–14.5)
Q-T INTERVAL, ECG07: 436 MS
QRS DURATION, ECG06: 94 MS
QTC CALCULATION (BEZET), ECG08: 463 MS
RBC # BLD AUTO: 2.17 M/UL (ref 4.23–5.67)
RBC # BLD AUTO: 2.89 M/UL (ref 4.23–5.67)
RBC # BLD AUTO: 3.15 M/UL (ref 4.23–5.67)
RBC # BLD AUTO: 3.19 M/UL (ref 4.23–5.67)
RBC # BLD AUTO: 3.21 M/UL (ref 4.23–5.67)
RBC # BLD AUTO: 3.33 M/UL (ref 4.23–5.67)
RBC # BLD AUTO: 3.36 M/UL (ref 4.23–5.67)
RETICS # AUTO: 0.1 M/UL (ref 0.03–0.1)
RETICS/RBC NFR AUTO: 4.8 % (ref 0.3–2)
RPR SER QL: NONREACTIVE
SODIUM SERPL-SCNC: 139 MMOL/L (ref 136–145)
SODIUM SERPL-SCNC: 139 MMOL/L (ref 136–145)
SODIUM SERPL-SCNC: 140 MMOL/L (ref 136–145)
SODIUM SERPL-SCNC: 141 MMOL/L (ref 136–145)
SODIUM SERPL-SCNC: 142 MMOL/L (ref 136–145)
SODIUM SERPL-SCNC: 142 MMOL/L (ref 136–145)
SP GR UR REFRACTOMETRY: 1.01 (ref 1–1.02)
SPECIMEN EXP DATE BLD: NORMAL
STATUS OF UNIT,%ST: NORMAL
T4 FREE SERPL-MCNC: 1.3 NG/DL (ref 0.78–1.46)
TIBC SERPL-MCNC: 384 UG/DL (ref 250–450)
TSH SERPL DL<=0.005 MIU/L-ACNC: 0.29 UIU/ML (ref 0.36–3.74)
UNIT DIVISION, %UDIV: 0
UROBILINOGEN UR QL STRIP.AUTO: 0.2 EU/DL (ref 0.2–1)
VENTRICULAR RATE, ECG03: 68 BPM
VIT B12 SERPL-MCNC: 389 PG/ML (ref 193–986)
WBC # BLD AUTO: 7 K/UL (ref 4.3–11.1)
WBC # BLD AUTO: 7.5 K/UL (ref 4.3–11.1)
WBC # BLD AUTO: 7.6 K/UL (ref 4.3–11.1)
WBC # BLD AUTO: 7.7 K/UL (ref 4.3–11.1)
WBC # BLD AUTO: 8.4 K/UL (ref 4.3–11.1)

## 2018-01-01 PROCEDURE — 36415 COLL VENOUS BLD VENIPUNCTURE: CPT | Performed by: PHYSICIAN ASSISTANT

## 2018-01-01 PROCEDURE — 74011250636 HC RX REV CODE- 250/636: Performed by: PHYSICIAN ASSISTANT

## 2018-01-01 PROCEDURE — 93451 RIGHT HEART CATH: CPT

## 2018-01-01 PROCEDURE — 86923 COMPATIBILITY TEST ELECTRIC: CPT | Performed by: PHYSICIAN ASSISTANT

## 2018-01-01 PROCEDURE — 74011250636 HC RX REV CODE- 250/636: Performed by: HOSPITALIST

## 2018-01-01 PROCEDURE — 74011250637 HC RX REV CODE- 250/637: Performed by: INTERNAL MEDICINE

## 2018-01-01 PROCEDURE — 36415 COLL VENOUS BLD VENIPUNCTURE: CPT | Performed by: INTERNAL MEDICINE

## 2018-01-01 PROCEDURE — 74011250637 HC RX REV CODE- 250/637: Performed by: PHYSICIAN ASSISTANT

## 2018-01-01 PROCEDURE — 84443 ASSAY THYROID STIM HORMONE: CPT | Performed by: HOSPITALIST

## 2018-01-01 PROCEDURE — 83880 ASSAY OF NATRIURETIC PEPTIDE: CPT | Performed by: PHYSICIAN ASSISTANT

## 2018-01-01 PROCEDURE — 36430 TRANSFUSION BLD/BLD COMPNT: CPT

## 2018-01-01 PROCEDURE — 74011250636 HC RX REV CODE- 250/636: Performed by: INTERNAL MEDICINE

## 2018-01-01 PROCEDURE — 74011636320 HC RX REV CODE- 636/320: Performed by: INTERNAL MEDICINE

## 2018-01-01 PROCEDURE — 77030039270 HC TU BLD FLTR CARD -A

## 2018-01-01 PROCEDURE — 93005 ELECTROCARDIOGRAM TRACING: CPT | Performed by: INTERNAL MEDICINE

## 2018-01-01 PROCEDURE — 4A023N6 MEASUREMENT OF CARDIAC SAMPLING AND PRESSURE, RIGHT HEART, PERCUTANEOUS APPROACH: ICD-10-PCS | Performed by: INTERNAL MEDICINE

## 2018-01-01 PROCEDURE — 85025 COMPLETE CBC W/AUTO DIFF WBC: CPT | Performed by: INTERNAL MEDICINE

## 2018-01-01 PROCEDURE — 99153 MOD SED SAME PHYS/QHP EA: CPT

## 2018-01-01 PROCEDURE — 65660000000 HC RM CCU STEPDOWN

## 2018-01-01 PROCEDURE — 93456 R HRT CORONARY ARTERY ANGIO: CPT

## 2018-01-01 PROCEDURE — 77030032490 HC SLV COMPR SCD KNE COVD -B

## 2018-01-01 PROCEDURE — 80048 BASIC METABOLIC PNL TOTAL CA: CPT | Performed by: INTERNAL MEDICINE

## 2018-01-01 PROCEDURE — 85046 RETICYTE/HGB CONCENTRATE: CPT | Performed by: PHYSICIAN ASSISTANT

## 2018-01-01 PROCEDURE — 94760 N-INVAS EAR/PLS OXIMETRY 1: CPT

## 2018-01-01 PROCEDURE — 77010033678 HC OXYGEN DAILY

## 2018-01-01 PROCEDURE — 82728 ASSAY OF FERRITIN: CPT | Performed by: PHYSICIAN ASSISTANT

## 2018-01-01 PROCEDURE — 97530 THERAPEUTIC ACTIVITIES: CPT

## 2018-01-01 PROCEDURE — 82746 ASSAY OF FOLIC ACID SERUM: CPT | Performed by: HOSPITALIST

## 2018-01-01 PROCEDURE — 74011000258 HC RX REV CODE- 258: Performed by: INTERNAL MEDICINE

## 2018-01-01 PROCEDURE — 83540 ASSAY OF IRON: CPT | Performed by: PHYSICIAN ASSISTANT

## 2018-01-01 PROCEDURE — 81003 URINALYSIS AUTO W/O SCOPE: CPT | Performed by: HOSPITALIST

## 2018-01-01 PROCEDURE — 97161 PT EVAL LOW COMPLEX 20 MIN: CPT

## 2018-01-01 PROCEDURE — 76060000031 HC ANESTHESIA FIRST 0.5 HR: Performed by: INTERNAL MEDICINE

## 2018-01-01 PROCEDURE — P9016 RBC LEUKOCYTES REDUCED: HCPCS | Performed by: PHYSICIAN ASSISTANT

## 2018-01-01 PROCEDURE — 74011250637 HC RX REV CODE- 250/637: Performed by: HOSPITALIST

## 2018-01-01 PROCEDURE — 86901 BLOOD TYPING SEROLOGIC RH(D): CPT | Performed by: PHYSICIAN ASSISTANT

## 2018-01-01 PROCEDURE — 74011000250 HC RX REV CODE- 250: Performed by: INTERNAL MEDICINE

## 2018-01-01 PROCEDURE — C9113 INJ PANTOPRAZOLE SODIUM, VIA: HCPCS | Performed by: INTERNAL MEDICINE

## 2018-01-01 PROCEDURE — 99152 MOD SED SAME PHYS/QHP 5/>YRS: CPT

## 2018-01-01 PROCEDURE — 85027 COMPLETE CBC AUTOMATED: CPT | Performed by: PHYSICIAN ASSISTANT

## 2018-01-01 PROCEDURE — 85025 COMPLETE CBC W/AUTO DIFF WBC: CPT | Performed by: PHYSICIAN ASSISTANT

## 2018-01-01 PROCEDURE — 80053 COMPREHEN METABOLIC PANEL: CPT | Performed by: PHYSICIAN ASSISTANT

## 2018-01-01 PROCEDURE — 82607 VITAMIN B-12: CPT | Performed by: HOSPITALIST

## 2018-01-01 PROCEDURE — 83735 ASSAY OF MAGNESIUM: CPT | Performed by: PHYSICIAN ASSISTANT

## 2018-01-01 PROCEDURE — 85610 PROTHROMBIN TIME: CPT | Performed by: PHYSICIAN ASSISTANT

## 2018-01-01 PROCEDURE — 85018 HEMOGLOBIN: CPT | Performed by: PHYSICIAN ASSISTANT

## 2018-01-01 PROCEDURE — 74011000250 HC RX REV CODE- 250

## 2018-01-01 PROCEDURE — 93454 CORONARY ARTERY ANGIO S&I: CPT

## 2018-01-01 PROCEDURE — 84439 ASSAY OF FREE THYROXINE: CPT | Performed by: HOSPITALIST

## 2018-01-01 PROCEDURE — 80048 BASIC METABOLIC PNL TOTAL CA: CPT | Performed by: PHYSICIAN ASSISTANT

## 2018-01-01 PROCEDURE — 70450 CT HEAD/BRAIN W/O DYE: CPT

## 2018-01-01 PROCEDURE — 71046 X-RAY EXAM CHEST 2 VIEWS: CPT

## 2018-01-01 PROCEDURE — B2111ZZ FLUOROSCOPY OF MULTIPLE CORONARY ARTERIES USING LOW OSMOLAR CONTRAST: ICD-10-PCS | Performed by: INTERNAL MEDICINE

## 2018-01-01 PROCEDURE — 74011250636 HC RX REV CODE- 250/636

## 2018-01-01 PROCEDURE — 86592 SYPHILIS TEST NON-TREP QUAL: CPT | Performed by: HOSPITALIST

## 2018-01-01 PROCEDURE — 0DJ08ZZ INSPECTION OF UPPER INTESTINAL TRACT, VIA NATURAL OR ARTIFICIAL OPENING ENDOSCOPIC: ICD-10-PCS | Performed by: INTERNAL MEDICINE

## 2018-01-01 PROCEDURE — C8929 TTE W OR WO FOL WCON,DOPPLER: HCPCS

## 2018-01-01 PROCEDURE — 76040000025: Performed by: INTERNAL MEDICINE

## 2018-01-01 PROCEDURE — 83010 ASSAY OF HAPTOGLOBIN QUANT: CPT | Performed by: PHYSICIAN ASSISTANT

## 2018-01-01 RX ORDER — ONDANSETRON 2 MG/ML
4 INJECTION INTRAMUSCULAR; INTRAVENOUS
Status: DISCONTINUED | OUTPATIENT
Start: 2018-01-01 | End: 2018-01-01 | Stop reason: HOSPADM

## 2018-01-01 RX ORDER — PANTOPRAZOLE SODIUM 40 MG/1
40 TABLET, DELAYED RELEASE ORAL
Qty: 30 TAB | Refills: 11 | Status: SHIPPED | OUTPATIENT
Start: 2018-01-01

## 2018-01-01 RX ORDER — TRAZODONE HYDROCHLORIDE 50 MG/1
50 TABLET ORAL
Status: DISCONTINUED | OUTPATIENT
Start: 2018-01-01 | End: 2018-01-01 | Stop reason: HOSPADM

## 2018-01-01 RX ORDER — MORPHINE SULFATE 2 MG/ML
2 INJECTION, SOLUTION INTRAMUSCULAR; INTRAVENOUS
Status: DISCONTINUED | OUTPATIENT
Start: 2018-01-01 | End: 2018-01-01 | Stop reason: HOSPADM

## 2018-01-01 RX ORDER — SODIUM CHLORIDE 9 MG/ML
250 INJECTION, SOLUTION INTRAVENOUS AS NEEDED
Status: DISCONTINUED | OUTPATIENT
Start: 2018-01-01 | End: 2018-01-01 | Stop reason: HOSPADM

## 2018-01-01 RX ORDER — PROPOFOL 10 MG/ML
INJECTION, EMULSION INTRAVENOUS AS NEEDED
Status: DISCONTINUED | OUTPATIENT
Start: 2018-01-01 | End: 2018-01-01 | Stop reason: HOSPADM

## 2018-01-01 RX ORDER — HYDROCODONE BITARTRATE AND ACETAMINOPHEN 7.5; 325 MG/1; MG/1
1 TABLET ORAL
Status: DISCONTINUED | OUTPATIENT
Start: 2018-01-01 | End: 2018-01-01 | Stop reason: HOSPADM

## 2018-01-01 RX ORDER — SODIUM CHLORIDE, SODIUM LACTATE, POTASSIUM CHLORIDE, CALCIUM CHLORIDE 600; 310; 30; 20 MG/100ML; MG/100ML; MG/100ML; MG/100ML
1000 INJECTION, SOLUTION INTRAVENOUS CONTINUOUS
Status: DISCONTINUED | OUTPATIENT
Start: 2018-01-01 | End: 2018-01-01 | Stop reason: HOSPADM

## 2018-01-01 RX ORDER — MIDAZOLAM HYDROCHLORIDE 1 MG/ML
.5-2 INJECTION, SOLUTION INTRAMUSCULAR; INTRAVENOUS
Status: DISCONTINUED | OUTPATIENT
Start: 2018-01-01 | End: 2018-01-01

## 2018-01-01 RX ORDER — FENTANYL CITRATE 50 UG/ML
25-100 INJECTION, SOLUTION INTRAMUSCULAR; INTRAVENOUS
Status: DISCONTINUED | OUTPATIENT
Start: 2018-01-01 | End: 2018-01-01

## 2018-01-01 RX ORDER — METOPROLOL TARTRATE 25 MG/1
25 TABLET, FILM COATED ORAL DAILY
COMMUNITY
End: 2018-01-01

## 2018-01-01 RX ORDER — PANTOPRAZOLE SODIUM 40 MG/1
40 TABLET, DELAYED RELEASE ORAL
Status: DISCONTINUED | OUTPATIENT
Start: 2018-01-01 | End: 2018-01-01 | Stop reason: HOSPADM

## 2018-01-01 RX ORDER — FUROSEMIDE 10 MG/ML
40 INJECTION INTRAMUSCULAR; INTRAVENOUS DAILY
Status: DISCONTINUED | OUTPATIENT
Start: 2018-01-01 | End: 2018-01-01

## 2018-01-01 RX ORDER — METOPROLOL SUCCINATE 50 MG/1
50 TABLET, EXTENDED RELEASE ORAL DAILY
Status: DISCONTINUED | OUTPATIENT
Start: 2018-01-01 | End: 2018-01-01 | Stop reason: HOSPADM

## 2018-01-01 RX ORDER — NALOXONE HYDROCHLORIDE 0.4 MG/ML
0.4 INJECTION, SOLUTION INTRAMUSCULAR; INTRAVENOUS; SUBCUTANEOUS AS NEEDED
Status: DISCONTINUED | OUTPATIENT
Start: 2018-01-01 | End: 2018-01-01 | Stop reason: HOSPADM

## 2018-01-01 RX ORDER — SODIUM CHLORIDE 0.9 % (FLUSH) 0.9 %
5-10 SYRINGE (ML) INJECTION AS NEEDED
Status: DISCONTINUED | OUTPATIENT
Start: 2018-01-01 | End: 2018-01-01 | Stop reason: HOSPADM

## 2018-01-01 RX ORDER — ACETAMINOPHEN 325 MG/1
650 TABLET ORAL
Status: DISCONTINUED | OUTPATIENT
Start: 2018-01-01 | End: 2018-01-01 | Stop reason: SDUPTHER

## 2018-01-01 RX ORDER — METOPROLOL SUCCINATE 25 MG/1
25 TABLET, EXTENDED RELEASE ORAL DAILY
Qty: 30 TAB | Refills: 11 | Status: SHIPPED | OUTPATIENT
Start: 2018-01-01

## 2018-01-01 RX ORDER — SODIUM CHLORIDE 0.9 % (FLUSH) 0.9 %
5-10 SYRINGE (ML) INJECTION EVERY 8 HOURS
Status: DISCONTINUED | OUTPATIENT
Start: 2018-01-01 | End: 2018-01-01 | Stop reason: HOSPADM

## 2018-01-01 RX ORDER — SIMVASTATIN 40 MG/1
40 TABLET, FILM COATED ORAL
COMMUNITY

## 2018-01-01 RX ORDER — NITROGLYCERIN 0.4 MG/1
0.4 TABLET SUBLINGUAL
Status: DISCONTINUED | OUTPATIENT
Start: 2018-01-01 | End: 2018-01-01 | Stop reason: HOSPADM

## 2018-01-01 RX ORDER — HEPARIN SODIUM 200 [USP'U]/100ML
3 INJECTION, SOLUTION INTRAVENOUS CONTINUOUS
Status: DISCONTINUED | OUTPATIENT
Start: 2018-01-01 | End: 2018-01-01

## 2018-01-01 RX ORDER — HYDROCODONE BITARTRATE AND ACETAMINOPHEN 5; 325 MG/1; MG/1
1 TABLET ORAL
Status: DISCONTINUED | OUTPATIENT
Start: 2018-01-01 | End: 2018-01-01 | Stop reason: HOSPADM

## 2018-01-01 RX ORDER — LEVOTHYROXINE SODIUM 125 UG/1
125 TABLET ORAL
Status: DISCONTINUED | OUTPATIENT
Start: 2018-01-01 | End: 2018-01-01 | Stop reason: HOSPADM

## 2018-01-01 RX ORDER — FUROSEMIDE 10 MG/ML
20 INJECTION INTRAMUSCULAR; INTRAVENOUS ONCE
Status: COMPLETED | OUTPATIENT
Start: 2018-01-01 | End: 2018-01-01

## 2018-01-01 RX ORDER — GUAIFENESIN 100 MG/5ML
81 LIQUID (ML) ORAL DAILY
Status: DISCONTINUED | OUTPATIENT
Start: 2018-01-01 | End: 2018-01-01 | Stop reason: HOSPADM

## 2018-01-01 RX ORDER — LIDOCAINE HYDROCHLORIDE 20 MG/ML
INJECTION, SOLUTION EPIDURAL; INFILTRATION; INTRACAUDAL; PERINEURAL AS NEEDED
Status: DISCONTINUED | OUTPATIENT
Start: 2018-01-01 | End: 2018-01-01 | Stop reason: HOSPADM

## 2018-01-01 RX ORDER — ACETAMINOPHEN 325 MG/1
650 TABLET ORAL
Status: DISCONTINUED | OUTPATIENT
Start: 2018-01-01 | End: 2018-01-01 | Stop reason: HOSPADM

## 2018-01-01 RX ORDER — ONDANSETRON 8 MG/1
4 TABLET, ORALLY DISINTEGRATING ORAL
Status: DISCONTINUED | OUTPATIENT
Start: 2018-01-01 | End: 2018-01-01 | Stop reason: HOSPADM

## 2018-01-01 RX ORDER — LANOLIN ALCOHOL/MO/W.PET/CERES
325 CREAM (GRAM) TOPICAL 2 TIMES DAILY WITH MEALS
Qty: 60 TAB | Refills: 11 | Status: SHIPPED | OUTPATIENT
Start: 2018-01-01

## 2018-01-01 RX ORDER — SIMVASTATIN 20 MG/1
20 TABLET, FILM COATED ORAL
Status: DISCONTINUED | OUTPATIENT
Start: 2018-01-01 | End: 2018-01-01 | Stop reason: HOSPADM

## 2018-01-01 RX ORDER — SODIUM CHLORIDE 9 MG/ML
75 INJECTION, SOLUTION INTRAVENOUS CONTINUOUS
Status: DISCONTINUED | OUTPATIENT
Start: 2018-01-01 | End: 2018-01-01

## 2018-01-01 RX ORDER — LIDOCAINE HYDROCHLORIDE 20 MG/ML
60-200 INJECTION, SOLUTION INFILTRATION; PERINEURAL
Status: DISCONTINUED | OUTPATIENT
Start: 2018-01-01 | End: 2018-01-01

## 2018-01-01 RX ORDER — FUROSEMIDE 10 MG/ML
40 INJECTION INTRAMUSCULAR; INTRAVENOUS 2 TIMES DAILY
Status: DISCONTINUED | OUTPATIENT
Start: 2018-01-01 | End: 2018-01-01

## 2018-01-01 RX ORDER — LEVOTHYROXINE SODIUM 150 UG/1
150 TABLET ORAL
Status: DISCONTINUED | OUTPATIENT
Start: 2018-01-01 | End: 2018-01-01

## 2018-01-01 RX ORDER — IBUPROFEN 200 MG
1 TABLET ORAL DAILY
Status: DISCONTINUED | OUTPATIENT
Start: 2018-01-01 | End: 2018-01-01 | Stop reason: HOSPADM

## 2018-01-01 RX ORDER — LANOLIN ALCOHOL/MO/W.PET/CERES
1 CREAM (GRAM) TOPICAL 2 TIMES DAILY WITH MEALS
Status: DISCONTINUED | OUTPATIENT
Start: 2018-01-01 | End: 2018-01-01 | Stop reason: HOSPADM

## 2018-01-01 RX ADMIN — FERROUS SULFATE TAB 325 MG (65 MG ELEMENTAL FE) 325 MG: 325 (65 FE) TAB at 08:25

## 2018-01-01 RX ADMIN — FERROUS SULFATE TAB 325 MG (65 MG ELEMENTAL FE) 325 MG: 325 (65 FE) TAB at 09:16

## 2018-01-01 RX ADMIN — METOPROLOL SUCCINATE 50 MG: 50 TABLET, EXTENDED RELEASE ORAL at 08:41

## 2018-01-01 RX ADMIN — TRAZODONE HYDROCHLORIDE 50 MG: 50 TABLET ORAL at 21:47

## 2018-01-01 RX ADMIN — SIMVASTATIN 20 MG: 20 TABLET, FILM COATED ORAL at 21:53

## 2018-01-01 RX ADMIN — SODIUM CHLORIDE 40 MG: 9 INJECTION, SOLUTION INTRAMUSCULAR; INTRAVENOUS; SUBCUTANEOUS at 09:03

## 2018-01-01 RX ADMIN — Medication 10 ML: at 14:00

## 2018-01-01 RX ADMIN — MIDAZOLAM HYDROCHLORIDE 1 MG: 1 INJECTION, SOLUTION INTRAMUSCULAR; INTRAVENOUS at 13:23

## 2018-01-01 RX ADMIN — FUROSEMIDE 20 MG: 10 INJECTION, SOLUTION INTRAMUSCULAR; INTRAVENOUS at 05:05

## 2018-01-01 RX ADMIN — METOPROLOL SUCCINATE 50 MG: 50 TABLET, EXTENDED RELEASE ORAL at 10:47

## 2018-01-01 RX ADMIN — Medication 5 ML: at 22:02

## 2018-01-01 RX ADMIN — PANTOPRAZOLE SODIUM 40 MG: 40 TABLET, DELAYED RELEASE ORAL at 08:41

## 2018-01-01 RX ADMIN — SIMVASTATIN 20 MG: 20 TABLET, FILM COATED ORAL at 22:02

## 2018-01-01 RX ADMIN — FERROUS SULFATE TAB 325 MG (65 MG ELEMENTAL FE) 325 MG: 325 (65 FE) TAB at 16:39

## 2018-01-01 RX ADMIN — IRON DEXTRAN 500 MG: 50 INJECTION INTRAMUSCULAR; INTRAVENOUS at 14:00

## 2018-01-01 RX ADMIN — PANTOPRAZOLE SODIUM 40 MG: 40 TABLET, DELAYED RELEASE ORAL at 17:38

## 2018-01-01 RX ADMIN — SIMVASTATIN 20 MG: 20 TABLET, FILM COATED ORAL at 21:39

## 2018-01-01 RX ADMIN — FERROUS SULFATE TAB 325 MG (65 MG ELEMENTAL FE) 325 MG: 325 (65 FE) TAB at 08:41

## 2018-01-01 RX ADMIN — FERROUS SULFATE TAB 325 MG (65 MG ELEMENTAL FE) 325 MG: 325 (65 FE) TAB at 17:22

## 2018-01-01 RX ADMIN — FERROUS SULFATE TAB 325 MG (65 MG ELEMENTAL FE) 325 MG: 325 (65 FE) TAB at 09:07

## 2018-01-01 RX ADMIN — PANTOPRAZOLE SODIUM 40 MG: 40 TABLET, DELAYED RELEASE ORAL at 08:00

## 2018-01-01 RX ADMIN — IRON DEXTRAN 25 MG: 50 INJECTION INTRAMUSCULAR; INTRAVENOUS at 11:27

## 2018-01-01 RX ADMIN — ONDANSETRON 4 MG: 8 TABLET, ORALLY DISINTEGRATING ORAL at 08:41

## 2018-01-01 RX ADMIN — ASPIRIN 81 MG 81 MG: 81 TABLET ORAL at 10:53

## 2018-01-01 RX ADMIN — LIDOCAINE HYDROCHLORIDE 60 MG: 20 INJECTION, SOLUTION INFILTRATION; PERINEURAL at 13:06

## 2018-01-01 RX ADMIN — TRAZODONE HYDROCHLORIDE 50 MG: 50 TABLET ORAL at 21:50

## 2018-01-01 RX ADMIN — LEVOTHYROXINE SODIUM 125 MCG: 125 TABLET ORAL at 09:05

## 2018-01-01 RX ADMIN — FERROUS SULFATE TAB 325 MG (65 MG ELEMENTAL FE) 325 MG: 325 (65 FE) TAB at 13:17

## 2018-01-01 RX ADMIN — PANTOPRAZOLE SODIUM 40 MG: 40 TABLET, DELAYED RELEASE ORAL at 15:57

## 2018-01-01 RX ADMIN — Medication 5 ML: at 21:49

## 2018-01-01 RX ADMIN — Medication 10 ML: at 16:00

## 2018-01-01 RX ADMIN — PROPOFOL 50 MG: 10 INJECTION, EMULSION INTRAVENOUS at 11:11

## 2018-01-01 RX ADMIN — TRAZODONE HYDROCHLORIDE 50 MG: 50 TABLET ORAL at 21:39

## 2018-01-01 RX ADMIN — LEVOTHYROXINE SODIUM 125 MCG: 125 TABLET ORAL at 09:16

## 2018-01-01 RX ADMIN — SODIUM CHLORIDE, SODIUM LACTATE, POTASSIUM CHLORIDE, AND CALCIUM CHLORIDE 1000 ML: 600; 310; 30; 20 INJECTION, SOLUTION INTRAVENOUS at 10:29

## 2018-01-01 RX ADMIN — METOPROLOL SUCCINATE 50 MG: 50 TABLET, EXTENDED RELEASE ORAL at 09:00

## 2018-01-01 RX ADMIN — METOPROLOL SUCCINATE 50 MG: 50 TABLET, EXTENDED RELEASE ORAL at 09:10

## 2018-01-01 RX ADMIN — FERROUS SULFATE TAB 325 MG (65 MG ELEMENTAL FE) 325 MG: 325 (65 FE) TAB at 08:00

## 2018-01-01 RX ADMIN — TRAZODONE HYDROCHLORIDE 50 MG: 50 TABLET ORAL at 22:02

## 2018-01-01 RX ADMIN — FERROUS SULFATE TAB 325 MG (65 MG ELEMENTAL FE) 325 MG: 325 (65 FE) TAB at 17:10

## 2018-01-01 RX ADMIN — FENTANYL CITRATE 50 MCG: 50 INJECTION, SOLUTION INTRAMUSCULAR; INTRAVENOUS at 13:20

## 2018-01-01 RX ADMIN — FENTANYL CITRATE 50 MCG: 50 INJECTION, SOLUTION INTRAMUSCULAR; INTRAVENOUS at 13:03

## 2018-01-01 RX ADMIN — METOPROLOL SUCCINATE 50 MG: 50 TABLET, EXTENDED RELEASE ORAL at 09:06

## 2018-01-01 RX ADMIN — SIMVASTATIN 20 MG: 20 TABLET, FILM COATED ORAL at 21:50

## 2018-01-01 RX ADMIN — IOPAMIDOL 120 ML: 755 INJECTION, SOLUTION INTRAVENOUS at 13:38

## 2018-01-01 RX ADMIN — SIMVASTATIN 20 MG: 20 TABLET, FILM COATED ORAL at 21:56

## 2018-01-01 RX ADMIN — FERROUS SULFATE TAB 325 MG (65 MG ELEMENTAL FE) 325 MG: 325 (65 FE) TAB at 17:38

## 2018-01-01 RX ADMIN — PROPOFOL 20 MG: 10 INJECTION, EMULSION INTRAVENOUS at 11:14

## 2018-01-01 RX ADMIN — MIDAZOLAM HYDROCHLORIDE 2 MG: 1 INJECTION, SOLUTION INTRAMUSCULAR; INTRAVENOUS at 13:03

## 2018-01-01 RX ADMIN — TRAZODONE HYDROCHLORIDE 50 MG: 50 TABLET ORAL at 21:53

## 2018-01-01 RX ADMIN — HEPARIN SODIUM 3 ML/HR: 5000 INJECTION, SOLUTION INTRAVENOUS; SUBCUTANEOUS at 13:04

## 2018-01-01 RX ADMIN — PERFLUTREN 1 ML: 6.52 INJECTION, SUSPENSION INTRAVENOUS at 15:00

## 2018-01-01 RX ADMIN — HEPARIN SODIUM 2 ML: 10000 INJECTION, SOLUTION INTRAVENOUS; SUBCUTANEOUS at 13:10

## 2018-01-01 RX ADMIN — LIDOCAINE HYDROCHLORIDE 60 MG: 20 INJECTION, SOLUTION INFILTRATION; PERINEURAL at 13:08

## 2018-01-01 RX ADMIN — FERROUS SULFATE TAB 325 MG (65 MG ELEMENTAL FE) 325 MG: 325 (65 FE) TAB at 16:00

## 2018-01-01 RX ADMIN — PANTOPRAZOLE SODIUM 40 MG: 40 TABLET, DELAYED RELEASE ORAL at 16:39

## 2018-01-01 RX ADMIN — LEVOTHYROXINE SODIUM 125 MCG: 125 TABLET ORAL at 09:35

## 2018-01-01 RX ADMIN — LEVOTHYROXINE SODIUM 125 MCG: 125 TABLET ORAL at 08:00

## 2018-01-01 RX ADMIN — LIDOCAINE HYDROCHLORIDE 40 MG: 20 INJECTION, SOLUTION EPIDURAL; INFILTRATION; INTRACAUDAL; PERINEURAL at 11:11

## 2018-01-01 RX ADMIN — METOPROLOL SUCCINATE 50 MG: 50 TABLET, EXTENDED RELEASE ORAL at 09:03

## 2018-01-01 RX ADMIN — Medication 10 ML: at 21:57

## 2018-01-01 RX ADMIN — FUROSEMIDE 40 MG: 10 INJECTION, SOLUTION INTRAMUSCULAR; INTRAVENOUS at 09:06

## 2018-01-01 RX ADMIN — PANTOPRAZOLE SODIUM 40 MG: 40 TABLET, DELAYED RELEASE ORAL at 08:25

## 2018-01-01 RX ADMIN — PANTOPRAZOLE SODIUM 40 MG: 40 TABLET, DELAYED RELEASE ORAL at 09:05

## 2018-01-01 RX ADMIN — PROPOFOL 20 MG: 10 INJECTION, EMULSION INTRAVENOUS at 11:16

## 2018-01-01 RX ADMIN — TRAZODONE HYDROCHLORIDE 50 MG: 50 TABLET ORAL at 21:56

## 2018-01-01 RX ADMIN — Medication 10 ML: at 21:53

## 2018-01-01 RX ADMIN — LEVOTHYROXINE SODIUM 125 MCG: 125 TABLET ORAL at 08:41

## 2018-01-01 RX ADMIN — PANTOPRAZOLE SODIUM 40 MG: 40 TABLET, DELAYED RELEASE ORAL at 17:10

## 2018-01-01 RX ADMIN — PANTOPRAZOLE SODIUM 40 MG: 40 TABLET, DELAYED RELEASE ORAL at 09:35

## 2018-01-01 RX ADMIN — SIMVASTATIN 20 MG: 20 TABLET, FILM COATED ORAL at 22:59

## 2018-01-01 RX ADMIN — FERROUS SULFATE TAB 325 MG (65 MG ELEMENTAL FE) 325 MG: 325 (65 FE) TAB at 16:30

## 2018-01-01 RX ADMIN — PANTOPRAZOLE SODIUM 40 MG: 40 TABLET, DELAYED RELEASE ORAL at 16:31

## 2018-01-01 RX ADMIN — SIMVASTATIN 20 MG: 20 TABLET, FILM COATED ORAL at 21:47

## 2018-01-01 RX ADMIN — SODIUM CHLORIDE 40 MG: 9 INJECTION, SOLUTION INTRAMUSCULAR; INTRAVENOUS; SUBCUTANEOUS at 02:35

## 2018-01-01 RX ADMIN — LEVOTHYROXINE SODIUM 125 MCG: 125 TABLET ORAL at 08:25

## 2018-01-01 RX ADMIN — PANTOPRAZOLE SODIUM 40 MG: 40 TABLET, DELAYED RELEASE ORAL at 09:16

## 2018-01-01 RX ADMIN — LEVOTHYROXINE SODIUM 150 MCG: 150 TABLET ORAL at 05:11

## 2018-01-01 RX ADMIN — FUROSEMIDE 40 MG: 10 INJECTION, SOLUTION INTRAMUSCULAR; INTRAVENOUS at 17:23

## 2018-01-01 RX ADMIN — FUROSEMIDE 20 MG: 10 INJECTION, SOLUTION INTRAMUSCULAR; INTRAVENOUS at 22:58

## 2018-01-01 RX ADMIN — SODIUM CHLORIDE 75 ML/HR: 900 INJECTION, SOLUTION INTRAVENOUS at 16:41

## 2018-01-01 RX ADMIN — FUROSEMIDE 40 MG: 10 INJECTION, SOLUTION INTRAMUSCULAR; INTRAVENOUS at 09:03

## 2018-01-01 RX ADMIN — TRAZODONE HYDROCHLORIDE 50 MG: 50 TABLET ORAL at 23:45

## 2018-01-01 RX ADMIN — PANTOPRAZOLE SODIUM 40 MG: 40 TABLET, DELAYED RELEASE ORAL at 16:30

## 2018-04-05 PROBLEM — I50.9 CHF (CONGESTIVE HEART FAILURE) (HCC): Status: ACTIVE | Noted: 2018-01-01

## 2018-04-05 PROBLEM — I50.33 DIASTOLIC CHF, ACUTE ON CHRONIC (HCC): Status: ACTIVE | Noted: 2018-01-01

## 2018-04-05 PROBLEM — R06.00 DYSPNEA: Status: ACTIVE | Noted: 2018-01-01

## 2018-04-05 PROBLEM — R41.82 ALTERED MENTAL STATUS: Status: ACTIVE | Noted: 2018-01-01

## 2018-04-05 PROBLEM — D64.9 SYMPTOMATIC ANEMIA: Status: ACTIVE | Noted: 2018-01-01

## 2018-04-05 NOTE — IP AVS SNAPSHOT
[address] 
[phone] Patient: Petrona Carroll MRN: [id number] :[id number] A check alex indicates which time of day the medication should be taken. My Medications START taking these medications Instructions Each Dose to Equal  
 Morning Noon Evening Bedtime  
 ferrous sulfate 325 mg (65 mg iron) tablet Take 1 Tab by mouth two (2) times daily (with meals). 325 mg  
    
  
   
   
  
   
  
 pantoprazole 40 mg tablet Commonly known as:  PROTONIX Take 1 Tab by mouth Before breakfast and dinner. 40 mg CHANGE how you take these medications Instructions Each Dose to Equal  
 Morning Noon Evening Bedtime  
 metoprolol succinate 25 mg XL tablet Commonly known as:  TOPROL-XL What changed:  how much to take Take 1 Tab by mouth daily. 25 mg  
    
  
   
   
   
  
 traZODone 50 mg tablet Commonly known as:  Lakshmi Deeds What changed:  additional instructions Take 1 Tab by mouth nightly. 50 mg CONTINUE taking these medications Instructions Each Dose to Equal  
 Morning Noon Evening Bedtime  
 furosemide 40 mg tablet Commonly known as:  LASIX Take  by mouth daily. levothyroxine 150 mcg tablet Commonly known as:  SYNTHROID Take  by mouth Daily (before breakfast). ZOCOR 40 mg tablet Generic drug:  simvastatin Take 40 mg by mouth nightly. 40 mg  
    
   
   
   
  
  
  
STOP taking these medications   
 metoprolol tartrate 25 mg tablet Commonly known as:  LOPRESSOR Where to Get Your Medications Information on where to get these meds will be given to you by the nurse or doctor. ! Ask your nurse or doctor about these medications  
  ferrous sulfate 325 mg (65 mg iron) tablet metoprolol succinate 25 mg XL tablet  
 pantoprazole 40 mg tablet

## 2018-04-05 NOTE — IP AVS SNAPSHOT
[facility] 
 
 
 [address] 
[phone] Patient: Huma Trujillo MRN: [id number] :1945 About your hospitalization You were admitted on:  2018 You last received care in the:  [facility] 3 TELEMETRY You were discharged on:  2018 Why you were hospitalized Your primary diagnosis was:  Diastolic Chf, Acute On Chronic (Hcc) Your diagnoses also included:  Dyspnea, Altered Mental Status, Hypothyroidism, Tobacco Use Disorder, Nonrheumatic Aortic Valve Stenosis, Symptomatic Anemia Follow-up Information Follow up With Details Comments Contact Info Rui Rodriguez MD  As needed 3700 [facility] [address]  [address] 
[phone] Anuj Doan MD On 2018 TC-7 at 11:15 am in [place] office [address] 
ABELARDO 400 [facility] Cardiology [facility] [address] 
[phone] [address] [address] [address] [place] [id number] 
[phone] Your Scheduled Appointments 2018 11:15 AM EDT TRANSITIONAL CARE MANAGEMENT with Anuj Doan MD  
[facility] OFFICE ([address] [place] [address]) [address] [place] [address]  
[phone] 2018  1:00 PM EDT Sc Pft Comp-Spir,Dlco,Lung Vol with [facility] PULM FUNCTION LAB  
[facility] ([address] [place] [address]) [address]  
[phone] 2018  2:00 PM EDT  
CT CORONARY ART W CA W EF with SFD CT 59 SLICE UNIT 1  
[facility] RADIOLOGY CT SCAN ([address] [place] [address]) [address] [place] [address]  
[phone] 1. Arrive 30 minutes before exam for registration. 2. Patient to avoid caffeine, nicotine and antihistamines 12 hours prior to procedure. 3.Please arrive well hydrated. Drink plenty of fluids the day before and the day of your study. Do not eat anything 4 hours before your arrival time, however please continue to drink clear liquids until 2 hours prior to the arrival time. 4. If diabetic, please bring a list of any diabetic medications. 5. Dialysis patients do not need to have hydration. It is recommended the patient have dialysis within 24 hours after the test. 6. For Optimal imaging the patients heart rate must be 60 or lower. Patients SHOULD CONTINUE ALL MEDICATIONS on day of study. Estrella Lopez, [address] [place] [address] of Outpatient Medical Office Building Tuesday April 24, 2018  3:00 PM EDT  
1350 [facility] with [facility] US LOGIC 7 UNIT 2  
[facility] RADIOLOGY ULTRASOUND ([address] [place] [address]) [address]  
[phone] PATIENT ARRIVAL Please report 30 minutes early to check in.  
  
    
Estrella Lopez, [address] [place] [address] Outpatient Medical Office [facility] [address] Tuesday May 01, 2018 10:00 AM EDT  
CONSULT with Angel Jin MD  
[facility] ([address] [facility]) [address] 
[address] [facility]  
[phone] Wednesday May 09, 2018  3:00 PM EDT TransAortic Valve Replacement Follow up with Nj Marks MD  
11[address] [facility] ([facility] [address] [address] [phone] Discharge Orders Procedure Order Date Status Priority Quantity Spec Type Associated Dx CBC W/O DIFF 04/12/18 0807 Normal Routine 1 Whole Blood Comments:  Please draw on 4/16/18 Dx Anemia A check alex indicates which time of day the medication should be taken. My Medications START taking these medications  Instructions Each Dose to Equal  
 Morning Noon Evening Bedtime  
 ferrous sulfate 325 mg (65 mg iron) tablet Take 1 Tab by mouth two (2) times daily (with meals). 325 mg  
    
  
   
   
  
   
  
 pantoprazole 40 mg tablet Commonly known as:  PROTONIX Take 1 Tab by mouth Before breakfast and dinner. 40 mg CHANGE how you take these medications Instructions Each Dose to Equal  
 Morning Noon Evening Bedtime  
 metoprolol succinate 25 mg XL tablet Commonly known as:  TOPROL-XL What changed:  how much to take Take 1 Tab by mouth daily. 25 mg  
    
  
   
   
   
  
 traZODone 50 mg tablet Commonly known as:  Donneta [place] What changed:  additional instructions Take 1 Tab by mouth nightly. 50 mg CONTINUE taking these medications Instructions Each Dose to Equal  
 Morning Noon Evening Bedtime  
 furosemide 40 mg tablet Commonly known as:  LASIX Take  by mouth daily. levothyroxine 150 mcg tablet Commonly known as:  SYNTHROID Take  by mouth Daily (before breakfast). ZOCOR 40 mg tablet Generic drug:  simvastatin Take 40 mg by mouth nightly. 40 mg  
    
   
   
   
  
  
  
STOP taking these medications   
 metoprolol tartrate 25 mg tablet Commonly known as:  LOPRESSOR Where to Get Your Medications Information on where to get these meds will be given to you by the nurse or doctor. ! Ask your nurse or doctor about these medications  
  ferrous sulfate 325 mg (65 mg iron) tablet  
 metoprolol succinate 25 mg XL tablet  
 pantoprazole 40 mg tablet Discharge Instructions Learning About Transcatheter Aortic Valve Replacement (TAVR) What is TAVR? Transcatheter aortic valve replacement (TAVR) is a procedure that replaces the aortic heart valve. It is done to treat aortic valve stenosis.  In aortic valve stenosis, the valve between your heart and the large blood vessel that carries blood to the body (aorta) has narrowed. That forces the heart to pump harder to get enough blood through the valve. TAVR can help some people feel better and live longer. In TAVR, the doctor uses a catheter to put in the new heart valve. Open-heart surgery is not done. TAVR is a newer procedure. How well it works long-term is not known yet. And TAVR can cause serious problems. These include stroke, a heart attack during the procedure, or even death. TAVR may be a good option for a person who cannot have surgery or for a person who has a high risk of serious problems from open-heart surgery. For example, you might think about TAVR if you are not healthy enough for an open-heart surgery. TAVR may not be a good choice if an open-heart surgery is likely to be successful. A team of doctors will use professional guidelines to decide whether or not TAVR is a good choice for you. Your personal feelings are also important. Talk to your doctors about your goals for treatment. How is TAVR done? TAVR is often done through an incision (cut) in the groin. But sometimes a small cut is made in the chest. The doctor uses a tube called a catheter and special tools that fit inside the catheter. The doctor puts the catheter into a blood vessel and moves it through the blood vessel and into the heart. A specially designed artificial valve fits inside the catheter. The doctor then moves the new valve into the damaged aortic valve. The artificial valve expands and takes the place of the damaged aortic valve. You may be asleep for the procedure, or you may get a sedative that will help you relax. The surgery usually takes about 2 to 3 hours. You will have to stay in the hospital for several days after the procedure. What can you expect after TAVR?  
· While you are in the hospital, your doctors and nurses will monitor you to check how the new valve is working. · You will receive information from the hospital about diet, activities, and medicine. · You will need to have regular checkups with your doctor. · When you leave the hospital, your doctor may give you a blood thinner for a few months to prevent blood clots. If you get a blood thinner, be sure you get instructions about how to take your medicine safely. Blood thinners can cause serious bleeding problems. Follow-up care is a key part of your treatment and safety. Be sure to make and go to all appointments, and call your doctor if you are having problems. It's also a good idea to know your test results and keep a list of the medicines you take. Where can you learn more? Go to http://agueda-matthew.info/. Enter O191 in the search box to learn more about \"Learning About Transcatheter Aortic Valve Replacement (TAVR). \" 
Current as of: September 21, 2016 Content Version: 11.4 © 7056-1451 [employer]. Care instructions adapted under license by [employer] (which disclaims liability or warranty for this information). If you have questions about a medical condition or this instruction, always ask your healthcare professional. [place] [address] any warranty or liability for your use of this information. Cardiac Catheterization/Angiography Discharge Instructions *Check the puncture site frequently for swelling or bleeding. If you see any bleeding, lie down and apply pressure over the area with a clean town or washcloth. Notify your doctor for any redness, swelling, drainage or oozing from the puncture site. Notify your doctor for any fever or chills. *If the leg or arm with the puncture becomes cold, numb or painful, call Dr Roberto Terrazas *Activity should be limited for the next 48 hours.  Climb stairs as little as possible and avoid any stooping, bending or strenuous activity for 48 hours. No heavy lifting (anything over 10 pounds) for three days. *Do not drive for 48 hours. *You may resume your usual diet. Drink more fluids than usual. 
 
*Have a responsible person drive you home and stay with you for at least 24 hours after your heart catheterization/angiography. *You may remove the bandage from your cath site in 24 hours. You may shower in 24 hours. No tub baths, hot tubs or swimming for one week. Do not place any lotions, creams, powders, ointments over the puncture site for one week. You may place a clean band-aid over the puncture site each day for 5 days. Change this daily. Peptic Ulcer Disease: Care Instructions Your Care Instructions Peptic ulcers are sores on the inside of the stomach or the small intestine. They are usually caused by an infection with bacteria or from use of nonsteroidal anti-inflammatory drugs (NSAIDs). NSAIDs include aspirin, ibuprofen (Advil), and naproxen (Aleve). Your doctor may have prescribed medicine to reduce stomach acid. You also may need to take antibiotics if your peptic ulcers are caused by an infection. You can help your stomach heal and keep ulcers from coming back by making some changes in your lifestyle. Quit smoking, limit caffeine and alcohol, and reduce stress. Follow-up care is a key part of your treatment and safety. Be sure to make and go to all appointments, and call your doctor if you are having problems. It's also a good idea to know your test results and keep a list of the medicines you take. How can you care for yourself at home? · Take your medicines exactly as prescribed. Call your doctor if you think you are having a problem with your medicine. · Do not take aspirin or other NSAIDs such as ibuprofen (Advil or Motrin) or naproxen (Aleve). Ask your doctor what you can take for pain. · Do not smoke. Smoking can make ulcers worse.  If you need help quitting, talk to your doctor about stop-smoking programs and medicines. These can increase your chances of quitting for good. · Drink in moderation or avoid drinking alcohol. · Do not drink beverages that have caffeine if they bother your stomach. These include coffee, tea, and soda. · Eat a balanced diet of small, frequent meals. Make an appointment with a dietitian if you need help planning your meals. · Reduce stress. Avoid people and places that make you feel anxious, if you can. Learn ways to reduce stress, such as biofeedback, guided imagery, and meditation. When should you call for help? Call 911 anytime you think you may need emergency care. For example, call if: 
? · You vomit blood or what looks like coffee grounds. ? · You pass maroon or very bloody stools. ?Call your doctor now or seek immediate medical care if: 
? · You have new or worse belly pain. ? · Your stools are black and look like tar, or they have streaks of blood. ? · You are vomiting. ? Watch closely for changes in your health, and be sure to contact your doctor if: 
? · You do not feel better as expected. Where can you learn more? Go to http://agueda-matthew.info/. Enter D753 in the search box to learn more about \"Peptic Ulcer Disease: Care Instructions. \" Current as of: May 12, 2017 Content Version: 11.4 © 8285-5548 [email or web address].. Care instructions adapted under license by [employer] (which disclaims liability or warranty for this information). If you have questions about a medical condition or this instruction, always ask your healthcare professional. [place] [address] any warranty or liability for your use of this information. Heart Failure: Care Instructions Your Care Instructions Heart failure occurs when your heart does not pump as much blood as the body needs.  Failure does not mean that the heart has stopped pumping but rather that it is not pumping as well as it should. Over time, this causes fluid buildup in your lungs and other parts of your body. Fluid buildup can cause shortness of breath, fatigue, swollen ankles, and other problems. By taking medicines regularly, reducing sodium (salt) in your diet, checking your weight every day, and making lifestyle changes, you can feel better and live longer. Follow-up care is a key part of your treatment and safety. Be sure to make and go to all appointments, and call your doctor if you are having problems. It's also a good idea to know your test results and keep a list of the medicines you take. How can you care for yourself at home? Medicines ? · Be safe with medicines. Take your medicines exactly as prescribed. Call your doctor if you think you are having a problem with your medicine. ? · Do not take any vitamins, over-the-counter medicine, or herbal products without talking to your doctor first. Reche Baseman not take ibuprofen (Advil or Motrin) and naproxen (Aleve) without talking to your doctor first. They could make your heart failure worse. ? · You may be taking some of the following medicine. ¨ Beta-blockers can slow heart rate, decrease blood pressure, and improve your condition. Taking a beta-blocker may lower your chance of needing to be hospitalized. ¨ Angiotensin-converting enzyme inhibitors (ACEIs) reduce the heart's workload, lower blood pressure, and reduce swelling. Taking an ACEI may lower your chance of needing to be hospitalized again. ¨ Angiotensin II receptor blockers (ARBs) work like ACEIs. Your doctor may prescribe them instead of ACEIs. ¨ Diuretics, also called water pills, reduce swelling. ¨ Potassium supplements replace this important mineral, which is sometimes lost with diuretics. ¨ Aspirin and other blood thinners prevent blood clots, which can cause a stroke or heart attack. ? You will get more details on the specific medicines your doctor prescribes. Diet 
? · Your doctor may suggest that you limit sodium to 2,000 milligrams (mg) a day or less. That is less than 1 teaspoon of salt a day, including all the salt you eat in cooking or in packaged foods. People get most of their sodium from processed foods. Fast food and restaurant meals also tend to be very high in sodium. ? · Ask your doctor how much liquid you can drink each day. You may have to limit liquids. ?Weight ? · Weigh yourself without clothing at the same time each day. Record your weight. Call your doctor if you have a sudden weight gain, such as more than 2 to 3 pounds in a day or 5 pounds in a week. (Your doctor may suggest a different range of weight gain.) A sudden weight gain may mean that your heart failure is getting worse. ? Activity level ? · Start light exercise (if your doctor says it is okay). Even if you can only do a small amount, exercise will help you get stronger, have more energy, and manage your weight and your stress. Walking is an easy way to get exercise. Start out by walking a little more than you did before. Bit by bit, increase the amount you walk. ? · When you exercise, watch for signs that your heart is working too hard. You are pushing yourself too hard if you cannot talk while you are exercising. If you become short of breath or dizzy or have chest pain, stop, sit down, and rest.  
? · If you feel \"wiped out\" the day after you exercise, walk slower or for a shorter distance until you can work up to a better pace. ? · Get enough rest at night. Sleeping with 1 or 2 pillows under your upper body and head may help you breathe easier. ? Lifestyle changes ? · Do not smoke. Smoking can make a heart condition worse. If you need help quitting, talk to your doctor about stop-smoking programs and medicines. These can increase your chances of quitting for good. Quitting smoking may be the most important step you can take to protect your heart. ? · Limit alcohol to 2 drinks a day for men and 1 drink a day for women. Too much alcohol can cause health problems. ? · Avoid getting sick from colds and the flu. Get a pneumococcal vaccine shot. If you have had one before, ask your doctor whether you need another dose. Get a flu shot each year. If you must be around people with colds or the flu, wash your hands often. When should you call for help? Call 911 if you have symptoms of sudden heart failure such as: 
? · You have severe trouble breathing. ? · You cough up pink, foamy mucus. ? · You have a new irregular or rapid heartbeat. ?Call your doctor now or seek immediate medical care if: 
? · You have new or increased shortness of breath. ? · You are dizzy or lightheaded, or you feel like you may faint. ? · You have sudden weight gain, such as more than 2 to 3 pounds in a day or 5 pounds in a week. (Your doctor may suggest a different range of weight gain.) ? · You have increased swelling in your legs, ankles, or feet. ? · You are suddenly so tired or weak that you cannot do your usual activities. ? Watch closely for changes in your health, and be sure to contact your doctor if you develop new symptoms. Where can you learn more? Go to http://agueda-matthew.info/. Enter Z007 in the search box to learn more about \"Heart Failure: Care Instructions. \" Current as of: September 21, 2016 Content Version: 11.4 © 7477-8426 [employer]. Care instructions adapted under license by [employer] (which disclaims liability or warranty for this information). If you have questions about a medical condition or this instruction, always ask your healthcare professional. [place] [address] any warranty or liability for your use of this information. DISCHARGE SUMMARY from Nurse PATIENT INSTRUCTIONS: 
 
After general anesthesia or intravenous sedation, for 24 hours or while taking prescription Narcotics: · Limit your activities · Do not drive and operate hazardous machinery · Do not make important personal or business decisions · Do  not drink alcoholic beverages · If you have not urinated within 8 hours after discharge, please contact your surgeon on call. Report the following to your surgeon: 
· Excessive pain, swelling, redness or odor of or around the surgical area · Temperature over 100.5 · Nausea and vomiting lasting longer than 4 hours or if unable to take medications · Any signs of decreased circulation or nerve impairment to extremity: change in color, persistent  numbness, tingling, coldness or increase pain · Any questions What to do at Home:The patient is being discharged home in stable condition on a low saturated fat, low cholesterol and low salt diet. The patient is instructed to advance activities as tolerated to the limit of fatigue or shortness of breath. The patient is instructed to avoid all heavy lifting for 5 days. The patient is instructed to watch the cath site for bleeding/oozing; if seen, the patient is instructed to apply firm pressure with a clean cloth and call [facility] Cardiology at [phone]. The patient is instructed to watch for signs of infection which include: increasing area of redness, fever/hot to touch or purulent drainage at the catheterization site. The patient is instructed not to soak in a bathtub for 7-10 days, but is cleared to shower. The patient is instructed to call the office or return to the ER for immediate evaluation for any shortness of breath or chest pain not relieved by NTG.   
 
*  Please give a list of your current medications to your Primary Care Provider. *  Please update this list whenever your medications are discontinued, doses are 
    changed, or new medications (including over-the-counter products) are added. *  Please carry medication information at all times in case of emergency situations. These are general instructions for a healthy lifestyle: No smoking/ No tobacco products/ Avoid exposure to second hand smoke Surgeon General's Warning:  Quitting smoking now greatly reduces serious risk to your health. Obesity, smoking, and sedentary lifestyle greatly increases your risk for illness A healthy diet, regular physical exercise & weight monitoring are important for maintaining a healthy lifestyle You may be retaining fluid if you have a history of heart failure or if you experience any of the following symptoms:  Weight gain of 3 pounds or more overnight or 5 pounds in a week, increased swelling in our hands or feet or shortness of breath while lying flat in bed. Please call your doctor as soon as you notice any of these symptoms; do not wait until your next office visit. Recognize signs and symptoms of STROKE: 
 
F-face looks uneven A-arms unable to move or move unevenly S-speech slurred or non-existent T-time-call 911 as soon as signs and symptoms begin-DO NOT go Back to bed or wait to see if you get better-TIME IS BRAIN. Warning Signs of HEART ATTACK Call 911 if you have these symptoms: 
? Chest discomfort. Most heart attacks involve discomfort in the center of the chest that lasts more than a few minutes, or that goes away and comes back. It can feel like uncomfortable pressure, squeezing, fullness, or pain. ? Discomfort in other areas of the upper body. Symptoms can include pain or discomfort in one or both arms, the back, neck, jaw, or stomach. ? Shortness of breath with or without chest discomfort. ? Other signs may include breaking out in a cold sweat, nausea, or lightheadedness. Don't wait more than five minutes to call [address]! Fast action can save your life. Calling 911 is almost always the fastest way to get lifesaving treatment.  Emergency Medical Services staff can begin treatment when they arrive  up to an hour sooner than if someone gets to the hospital by car. The discharge information has been reviewed with the patient. The patient verbalized understanding. Discharge medications reviewed with the patient and appropriate educational materials and side effects teaching were provided. ___________________________________________________________________________________________________________________________________ SamtecharWallept Announcement We are excited to announce that we are making your provider's discharge notes available to you in [employer]. You will see these notes when they are completed and signed by the physician that discharged you from your recent hospital stay. If you have any questions or concerns about any information you see in [employer], please call the Health Information Department where you were seen or reach out to your Primary Care Provider for more information about your plan of care. Introducing [facility]! Benson Hidalgo introduces [employer] patient portal. Now you can access parts of your medical record, email your doctor's office, and request medication refills online. 1. In your internet browser, go to [email or web address] 2. Click on the First Time User? Click Here link in the Sign In box. You will see the New Member Sign Up page. 3. Enter your [employer] Access Code exactly as it appears below. You will not need to use this code after youve completed the sign-up process. If you do not sign up before the expiration date, you must request a new code. · [employer] Access Code: [email or web address] 
Expires: 6/6/2018  6:23 AM 
 
4. Enter the last four digits of your Social Security Number (xxxx) and Date of Birth (mm/dd/yyyy) as indicated and click Submit. You will be taken to the next sign-up page. 5. Create a MyCNjinit ID.  This will be your AddFleett login ID and cannot be changed, so think of one that is secure and easy to remember. 6. Create a CENTERSONIC password. You can change your password at any time. 7. Enter your Password Reset Question and Answer. This can be used at a later time if you forget your password. 8. Enter your e-mail address. You will receive e-mail notification when new information is available in [address] 9. Click Sign Up. You can now view and download portions of your medical record. 10. Click the Download Summary menu link to download a portable copy of your medical information. If you have questions, please visit the Frequently Asked Questions section of the CENTERSONIC website. Remember, CENTERSONIC is NOT to be used for urgent needs. For medical emergencies, dial 911. Now available from your iPhone and Android! Introducing Mil Isbell As a [facility] patient, I wanted to make you aware of our electronic visit tool called Mil Isbell. [employer]icient allows you to connect within minutes with a medical provider 24 hours a day, seven days a week via a mobile device or tablet or logging into a secure website from your computer. You can access Mil Isbell from anywhere in the [place]. A virtual visit might be right for you when you have a simple condition and feel like you just dont want to get out of bed, or cant get away from work for an appointment, when [facility] is not available (evenings, weekends or holidays), or when youre out of town and need minor care. Electronic visits cost only $49 and if the [employer] provider determines a prescription is needed to treat your condition, one can be electronically transmitted to a nearby pharmacy*. Please take a moment to enroll today if you have not already done so. The enrollment process is free and takes just a few minutes.   To enroll, please download the Verafin kathleen to your tablet or phone, or visit [email or web address] to enroll on your computer. And, as an [address] [place] [address] patient with a Minekey account, the results of your visits will be scanned into your electronic medical record and your primary care provider will be able to view the scanned results. We urge you to continue to see your regular [facility] provider for your ongoing medical care. And while your primary care provider may not be the one available when you seek a [employer] virtual visit, the peace of mind you get from getting a real diagnosis real time can be priceless. For more information on [employer], view our Frequently Asked Questions (FAQs) at [email or web address]. Sincerely, 
 
Matt Mcneil MD 
Chief Medical Officer Killian Radha Montano *:  certain medications cannot be prescribed via Gumroad Providers Seen During Your Hospitalization Provider Specialty Primary office phone Arnoldo Smith MD Cardiology [phone] Your Primary Care Physician (PCP) Primary Care Physician Office Phone Office Fax Glen Yusuf [phone] You are allergic to the following Allergen Reactions Shrimp Nausea and Vomiting Tetanus Vaccines And Toxoid Rash Recent Documentation Weight BMI Smoking Status 80.6 kg 26.23 kg/m2 Current Every Day Smoker Emergency Contacts Name Discharge Info Relation Home Work Mobile Leonidas Kyle  Friend [5] [phone] Gabinokeonlety Gowers  Other Relative [6] [id number]    
 ,Beryl Ayala  Other Relative [6] [phone]    
 ,Diane Begum Relative [6] [phone] Patient Belongings The following personal items are in your possession at time of discharge: 
  Dental Appliances: At home  Visual Aid: None      Home Medications: None   Jewelry: None  Clothing: At bedside    Other Valuables: None Please provide this summary of care documentation to your next provider. Signatures-by signing, you are acknowledging that this After Visit Summary has been reviewed with you and you have received a copy. Patient Signature:  ____________________________________________________________ Date:  ____________________________________________________________  
  
Liliane Faes Provider Signature:  ____________________________________________________________ Date:  ____________________________________________________________

## 2018-04-05 NOTE — PROGRESS NOTES
Patient is extremely anxious and short of breath. O2 saturation 95%. Paged Rachel Chapman NP to update him. Orders received to consult hospitalist due to altered mental status and anemia.

## 2018-04-05 NOTE — H&P
[facility] CARDIOLOGY  [address], [address] [facility], [address] [place] [address]  PHONE: [phone]   [facility]  Chloé Ro is a 67 y.o. male seen for a follow up visit regarding   Aortic Stenosis (Dr. Tamika Flores pt last seen 03-13-17. Pt could not stand to weigh.); Hypertension; and Shortness of Breath     He is worked in for shortness of breath. He has recently seen Dr Lloyd Aragon and started on diuretic antibiotics. He is still having dyspnea and is moaning groaning loudly today. He has not been resting at night and stays up in a chair. He has 24/7 care. He feels like he has sinus congestion and has pressure on his chest with worsening dyspnea.          Past Medical History, Past Surgical History, Family history, Social History, and Medications were all reviewed with the patient today and updated as necessary.             Outpatient Prescriptions Marked as Taking for the 4/5/18 encounter (Office Visit) with Amalia Mcguire MD   Medication Sig Dispense Refill    furosemide (LASIX) 40 mg tablet Take  by mouth daily.        levothyroxine (SYNTHROID) 150 mcg tablet Take  by mouth Daily (before breakfast).        traZODone (DESYREL) 50 mg tablet Take 1 Tab by mouth nightly. (Patient taking differently: Take 50 mg by mouth nightly.  As needed) 31 Tab 3    simvastatin (ZOCOR) 20 mg tablet Take  by mouth nightly.        metoprolol succinate (TOPROL-XL) 25 mg XL tablet Take  by mouth daily.               Patient Active Problem List     Diagnosis    CHF (congestive heart failure) (HCC)       aortic stenosis   EF has been normal  in the past  repeat echo        Nonrheumatic aortic valve stenosis    Hyperlipidemia    Endocrine disease       hypothyroid    Aortic valve regurgitation    Essential hypertension, benign    Heart murmur    Tobacco use disorder    Chest Pain     Dyslipidemia    Hypothyroidism    Chest pain      Allergies   Allergen Reactions    Shrimp Nausea and Vomiting    Tetanus Vaccines And Toxoid Rash           Past Medical History:   Diagnosis Date    Aortic valve regurgitation 11/23/2009    Chest Pain  7/6/2009    Dyslipidemia 7/6/2009    Endocrine disease       hypothyroid    Essential hypertension, benign 7/23/2009    Heart murmur 7/23/2009    Hyperlipidemia 8/8/2016    Hypothyroidism 7/6/2009    Tobacco use disorder 7/6/2009      No past surgical history on file. No family history on file. Social History   Substance Use Topics    Smoking status: Current Every Day Smoker       Packs/day: 2.00    Smokeless tobacco: Never Used    Alcohol use No               Review of Systems   Constitutional: Negative for weight loss. Eyes: Positive for blurred vision. Respiratory: Positive for cough and sputum production. Cardiovascular: Positive for leg swelling. Genitourinary: Positive for hematuria (?). Neurological: Positive for dizziness. Negative for loss of consciousness. Psychiatric/Behavioral: The patient has insomnia.                         Wt Readings from Last 3 Encounters:   11/27/17 165 lb (74.8 kg)   09/11/17 165 lb (74.8 kg)   08/02/17 175 lb (79.4 kg)          BP Readings from Last 3 Encounters:   04/05/18 138/60   11/28/17 136/75   09/11/17 158/78               Physical Exam   Constitutional: He appears well-developed and well-nourished. HENT:   Head: Normocephalic. Neck: No JVD present. Carotid bruit is not present. Cardiovascular: Normal rate and regular rhythm. Murmur heard. Harsh crescendo-decrescendo midsystolic murmur is present with a grade of 3/6  at the upper right sternal border radiating to the neck Diminished second heart sound   Pulmonary/Chest: Effort normal. He has rales. Abdominal: Soft. Musculoskeletal: He exhibits edema (2+ ankle edema ). Neurological: He is alert. Skin: Skin is warm and dry. pale   Psychiatric: He has a normal mood and affect.    Sinus  Tachycardia 105 bpm    -Poor R-wave progression -nonspecific -consider old anterior infarct.    -  Nonspecific T-abnormality. Medical problems and test results were reviewed with the patient today.      No results found for any visits on 04/05/18.        No results found for: HBA1C, HGBE8, RPR4PSYX, MTF2TNVR, OLN5ZTMI           Lab Results   Component Value Date/Time     WBC 6.1 11/27/2017 04:04 PM     HGB 8.9 (L) 11/27/2017 04:04 PM     HCT 27.4 (L) 11/27/2017 04:04 PM     PLATELET 447 21/92/6489 04:04 PM     MCV 83.3 11/27/2017 04:04 PM               Lab Results   Component Value Date/Time     Sodium 142 11/27/2017 04:04 PM     Potassium 4.4 11/27/2017 04:04 PM     Chloride 108 (H) 11/27/2017 04:04 PM     CO2 22 11/27/2017 04:04 PM     Anion gap 12 11/27/2017 04:04 PM     Glucose 86 11/27/2017 04:04 PM     BUN 30 (H) 11/27/2017 04:04 PM     Creatinine 1.15 11/27/2017 04:04 PM     GFR est AA >60 11/27/2017 04:04 PM     GFR est non-AA >60 11/27/2017 04:04 PM     Calcium 9.7 11/27/2017 04:04 PM               Lab Results   Component Value Date/Time     ALT (SGPT) 13 11/27/2017 04:04 PM     AST (SGOT) 11 (L) 11/27/2017 04:04 PM     Alk. phosphatase 64 11/27/2017 04:04 PM     Bilirubin, total 0.2 11/27/2017 04:04 PM               Lab Results   Component Value Date/Time     Cholesterol, total 231 (H) 07/05/2009 05:35 PM     HDL Cholesterol 40 07/05/2009 05:35 PM     LDL, calculated 157.8 (H) 07/05/2009 05:35 PM     VLDL, calculated 33.2 (H) 07/05/2009 05:35 PM     Triglyceride 166 (H) 07/05/2009 05:35 PM     CHOL/HDL Ratio 5.8 07/05/2009 05:35 PM            ASSESSMENT and PLAN     Diagnoses and all orders for this visit:              4. Acute diastolic congestive heart failure ([address]  Comments:  likely from critical AS and anemia  resting tachycardia ominous     echo and hospital admission   f/u labs   3. Nonrheumatic aortic valve stenosis  Comments:  at least severe and aggravated by anemia now with anginal chest pains dyspnea    possible TAVR candidate depending on comorbidities  5. Precordial pain     6. Anemia, unspecified type  Comments:  hgb 8.9  ER visit November  may be worse by his appearance needs evaluation and treatment      7.  Tobacco use disorder  Comments:  likely COPD  needs cessation                           Follow-up Disposition:  Return after hospitalization .              Christelle Mujica MD  4/5/2018  11:34 AM

## 2018-04-05 NOTE — PROGRESS NOTES
Spoke with Dr. Nubia Capellan with GI regarding consult for low HGB and HCT levels. Stated that he will be by to see patient soon.

## 2018-04-05 NOTE — PROGRESS NOTES
Dual admission skin assessment completed. Patient has overall pale appearance. Friends in room stated this is his baseline. Scattered ecchymosis noted on bilateral arms, along with some healed scars on his upper back. Sacrum is dry and intact. No other abnormalities noted at this time.

## 2018-04-05 NOTE — PROGRESS NOTES
Bedside and Verbal shift change report given to Chris Real RN (oncoming nurse) by self and JANNY Roman (offgoing nurses). Report included the following information SBAR, Kardex, MAR and Recent Results.

## 2018-04-05 NOTE — PROGRESS NOTES
Patient arrived to floor via wheelchair with friends. Patient placed on monitor and assessed. Discussed plan of care and oriented patient to room. Educated him on calling for assistance and turned bed alarm on. Call light within reach.

## 2018-04-06 NOTE — CONSULTS
Hospitalist H&P/Consult Note     Admit Date:  2018  1:38 PM   Name:  Jose D Kirkpatrick   Age:  67 y.o.  :  1945   MRN:  [id number]   PCP:  Lawrence Sandoval MD  Treatment Team: Attending Provider: Jayshree Diallo MD; Consulting Provider: Romy Sahu MD; Consulting Provider: Melissa Sexton MD    HPI:   68 y/o gentleman with hx hypothyroidism, aortic stenosis, smoking, HTN, HLD is admitted for acute CHF and symptomatic anemia. He is found to have a Hgb of 5.2 with microcytic indices, low iron of 18, and a BNP of 1510. He was at the [facility] office earlier today and was very anxious because he was so short of breath. Reportedly also had some behavioral outbursts as well and episodes of confusion. A family member is present in room and he states patient will sometimes get this way if he is not \"getting his way\". Feels he is also craving nicotine due to his tobacco dependence. No reported hx dementia or cognitive deficits. He is hypothyroid. No fever or chills. No focal neurologic deficits or complaints but does have significant stroke risk factors. Hospitalist service consulted for workup and evaluation of intermittent confusion and AMS. 10 systems reviewed and negative except as noted in HPI.negative chest pain  Past Medical History:   Diagnosis Date    Aortic valve regurgitation 2009    Chest Pain  2009    Dyslipidemia 2009    Endocrine disease     hypothyroid    Essential hypertension, benign 2009    Heart murmur 2009    Hyperlipidemia 2016    Hypothyroidism 2009    Tobacco use disorder 2009      No past surgical history on file. Prior to Admission Medications   Prescriptions Last Dose Informant Patient Reported? Taking?   furosemide (LASIX) 40 mg tablet   Yes No   Sig: Take  by mouth daily. levothyroxine (SYNTHROID) 150 mcg tablet   Yes No   Sig: Take  by mouth Daily (before breakfast).    metoprolol succinate (TOPROL-XL) 25 mg XL tablet   Yes No   Sig: Take  by mouth daily. simvastatin (ZOCOR) 20 mg tablet   Yes No   Sig: Take  by mouth nightly. traZODone (DESYREL) 50 mg tablet   No No   Sig: Take 1 Tab by mouth nightly. Patient taking differently: Take 50 mg by mouth nightly. As needed      Facility-Administered Medications: None     Allergies   Allergen Reactions    Shrimp Nausea and Vomiting    Tetanus Vaccines And Toxoid Rash      Social History   Substance Use Topics    Smoking status: Current Every Day Smoker     Packs/day: 2.00    Smokeless tobacco: Never Used    Alcohol use No      No family history on file. There is no immunization history on file for this patient. Objective:     Patient Vitals for the past 24 hrs:   Temp Pulse Resp BP SpO2   04/05/18 2140 97.6 °F (36.4 °C) 96 20 118/66 96 %   04/05/18 2025 99.1 °F (37.3 °C) 93 18 113/58 95 %   04/05/18 1925 98.3 °F (36.8 °C) 89 20 111/55 95 %   04/05/18 1918 98.2 °F (36.8 °C) 93 20 114/66 97 %   04/05/18 1912 98.2 °F (36.8 °C) 97 22 123/78 99 %   04/05/18 1901 97.5 °F (36.4 °C) 95 20 113/53 93 %   04/05/18 1723 - 94 20 108/59 94 %   04/05/18 1637 98 °F (36.7 °C) 98 20 107/67 92 %   04/05/18 1333 98 °F (36.7 °C) 96 20 113/59 97 %     Oxygen Therapy  O2 Sat (%): 96 % (04/05/18 2140)  O2 Device: Nasal cannula (04/05/18 1637)  O2 Flow Rate (L/min): 2 l/min (04/05/18 1616)    Intake/Output Summary (Last 24 hours) at 04/05/18 2221  Last data filed at 04/05/18 2219   Gross per 24 hour   Intake                0 ml   Output             1275 ml   Net            -1275 ml       Physical Exam:  General:    Thin, cachectic, pale. Alert. Oriented, pleasant   Eyes:   Normal sclera. Extraocular movements intact. ENT:  Normocephalic, atraumatic. Moist mucous membranes  CV:   RRR. Systolic murmur   Lungs:  CTAB. No wheezing, rhonchi, or rales. Abdomen: Soft, nontender, nondistended. Bowel sounds normal.   Extremities: Warm and dry.   No cyanosis edematous legs  Neurologic: CN II-XII grossly intact. Sensation intact. Skin:     No rashes or jaundice. No wounds. Psych:  Normal mood and affect. I reviewed the labs, imaging, EKGs, telemetry, and other studies done this admission. Data Review:   Recent Results (from the past 24 hour(s))   CBC WITH AUTOMATED DIFF    Collection Time: 04/05/18  3:24 PM   Result Value Ref Range    WBC 7.6 4.3 - 11.1 K/uL    RBC 2.17 (L) 4.23 - 5.67 M/uL    HGB 5.2 (LL) 13.6 - 17.2 g/dL    HCT 17.2 (LL) 41.1 - 50.3 %    MCV 79.3 (L) 79.6 - 97.8 FL    MCH 24.0 (L) 26.1 - 32.9 PG    MCHC 30.2 (L) 31.4 - 35.0 g/dL    RDW 19.3 (H) 11.9 - 14.6 %    PLATELET 683 792 - 714 K/uL    MPV 9.4 (L) 10.8 - 14.1 FL    DF AUTOMATED      NEUTROPHILS 89 (H) 43 - 78 %    LYMPHOCYTES 7 (L) 13 - 44 %    MONOCYTES 4 4.0 - 12.0 %    EOSINOPHILS 0 (L) 0.5 - 7.8 %    BASOPHILS 0 0.0 - 2.0 %    IMMATURE GRANULOCYTES 0 0.0 - 5.0 %    ABS. NEUTROPHILS 6.8 1.7 - 8.2 K/UL    ABS. LYMPHOCYTES 0.5 0.5 - 4.6 K/UL    ABS. MONOCYTES 0.3 0.1 - 1.3 K/UL    ABS. EOSINOPHILS 0.0 0.0 - 0.8 K/UL    ABS. BASOPHILS 0.0 0.0 - 0.2 K/UL    ABS. IMM. GRANS. 0.0 0.0 - 0.5 K/UL   METABOLIC PANEL, COMPREHENSIVE    Collection Time: 04/05/18  3:24 PM   Result Value Ref Range    Sodium 142 136 - 145 mmol/L    Potassium 5.0 3.5 - 5.1 mmol/L    Chloride 111 (H) 98 - 107 mmol/L    CO2 22 21 - 32 mmol/L    Anion gap 9 7 - 16 mmol/L    Glucose 109 (H) 65 - 100 mg/dL    BUN 54 (H) 8 - 23 MG/DL    Creatinine 1.85 (H) 0.8 - 1.5 MG/DL    GFR est AA 46 (L) >60 ml/min/1.73m2    GFR est non-AA 38 (L) >60 ml/min/1.73m2    Calcium 8.5 8.3 - 10.4 MG/DL    Bilirubin, total 0.3 0.2 - 1.1 MG/DL    ALT (SGPT) 14 12 - 65 U/L    AST (SGOT) 11 (L) 15 - 37 U/L    Alk.  phosphatase 57 50 - 136 U/L    Protein, total 6.5 6.3 - 8.2 g/dL    Albumin 2.9 (L) 3.2 - 4.6 g/dL    Globulin 3.6 (H) 2.3 - 3.5 g/dL    A-G Ratio 0.8 (L) 1.2 - 3.5     BNP    Collection Time: 04/05/18  3:24 PM   Result Value Ref Range    BNP 1510 pg/mL   MAGNESIUM Collection Time: 04/05/18  3:24 PM   Result Value Ref Range    Magnesium 2.5 (H) 1.8 - 2.4 mg/dL   IRON    Collection Time: 04/05/18  5:11 PM   Result Value Ref Range    Iron 24 (L) 35 - 150 ug/dL   FERRITIN    Collection Time: 04/05/18  5:11 PM   Result Value Ref Range    Ferritin 13 8 - 388 NG/ML   TRANSFERRIN SATURATION    Collection Time: 04/05/18  5:11 PM   Result Value Ref Range    Iron 18 (L) 35 - 150 ug/dL    TIBC 384 250 - 450 ug/dL    Transferrin Saturation 5 (L) >20 %   HAPTOGLOBIN    Collection Time: 04/05/18  5:11 PM   Result Value Ref Range    Haptoglobin 205 (H) 30 - 200 mg/dL   RETICULOCYTE COUNT    Collection Time: 04/05/18  5:11 PM   Result Value Ref Range    Reticulocyte count 4.8 (H) 0.3 - 2.0 %    Absolute Retic Cnt. 0.1018 (H) 0.026 - 0.095 M/ul    Immature Retic Fraction 22.7 (H) 2.3 - 13.4 %    Retic Hgb Conc. 16 (L) 29 - 35 pg   TYPE + CROSSMATCH    Collection Time: 04/05/18  5:11 PM   Result Value Ref Range    Crossmatch Expiration 04/08/2018     ABO/Rh(D) O POSITIVE     Antibody screen NEG     Unit number [id number]     Blood component type  LR     Unit division 00     Status of unit ISSUED     Crossmatch result Compatible     Unit number [id number]     Blood component type  LR     Unit division 00     Status of unit ALLOCATED     Crossmatch result Compatible        Imaging Studies:  CXR Results  (Last 48 hours)               04/05/18 1438  XR CHEST PA LAT Final result    Impression:  IMPRESSION: CHF. Narrative:  CHEST X-RAY, 2 views. HISTORY:  Shortness of breath. TECHNIQUE: PA and lateral views. COMPARISON: None. FINDINGS: Heart is enlarged. Interstitial prominence. Small pleural effusions. No lobar consolidation.                CT Results  (Last 48 hours)    None          Assessment and Plan:     Hospital Problems as of 4/5/2018  Date Reviewed: 3/13/2017          Codes Class Noted - Resolved POA    Dyspnea ICD-10-CM: R06.00  ICD-9-CM: 786.09  4/5/2018 - Present Yes        Diastolic CHF, acute on chronic [facility]) ICD-10-CM: I50.33  ICD-9-CM: 428.33, 428.0  4/5/2018 - Present Yes        Altered mental status ICD-10-CM: R41.82  ICD-9-CM: 780.97  4/5/2018 - Present Yes        Symptomatic anemia ICD-10-CM: D64.9  ICD-9-CM: 285.9  4/5/2018 - Present Yes        Nonrheumatic aortic valve stenosis ICD-10-CM: I35.0  ICD-9-CM: 424.1  8/31/2016 - Present Yes        Tobacco use disorder ICD-10-CM: F17.200  ICD-9-CM: 305.1  7/6/2009 - Present Yes        Hypothyroidism ICD-10-CM: E03.9  ICD-9-CM: 244.9  7/6/2009 - Present Yes              PLAN:  · Uncertain etiology of patient's altered mental status . On my exam he seemed fully lucid, pleasant and oriented. Nursing staff and family member report however that he had episodes of behavioral outbursts earlier. No specific reports of dementia or significant cognitive deficits  · By accounts he was extremely anxious earlier with shortness of breath. He could have had some hypoxia  · Will check a head CT to r/o any acute CVA, hydrocephalus. Multiple stroke risk factors  · Send UA to r/o UTI. Check TSH, T4  · Has significant microcytic anemia and is receiving blood transfusion  · With his CHF give lasix after blood  · GI to evaluate him. Iron levels are low.  Will also check B12, folate  · Provide nicotine patch for nicotine dependence  · Will follow and assist with his care      Estimated LOS: greater than 2 midnights     Signed:  Jose Domingo MD

## 2018-04-06 NOTE — PROGRESS NOTES
Problem: Heart Failure: Day 1  Goal: *Oxygen saturation within defined limits  Outcome: Progressing Towards Goal  Patient has maintained O2 saturation 90% and greater on room air to 2l/m O2 via NC. Goal: *Hemodynamically stable  Outcome: Resolved/Met Date Met: 04/05/18  Patient has been hemodynamically stable on current day with BP 100s-130s/50s-60s and HR 80s-100s. Goal: *Optimal pain control at patient's stated goal  Outcome: Resolved/Met Date Met: 04/05/18  Patient has denied pain on current shift. Goal: *Anxiety reduced or absent  Outcome: Progressing Towards Goal  Patient's anxiety appeared to decrease with O2 application and RN sitting with the patient and explaining plan of care. Problem: Falls - Risk of  Goal: *Absence of Falls  Document Vera Fall Risk and appropriate interventions in the flowsheet. Outcome: Progressing Towards Goal  Fall Risk Interventions:  Mobility Interventions: Bed/chair exit alarm, Communicate number of staff needed for ambulation/transfer, Patient to call before getting OOB         Medication Interventions: Patient to call before getting OOB, Teach patient to arise slowly, Bed/chair exit alarm       Patient progressing towards goal with no falls on current admission. Patient without confusion or sensory perception deficits. Patient has unsteady gait on ambulation. Personal belongings are within reach. Bed is in the low and locked position with side rails up x3 and bed alarm active. Yellow gripper socks to bilateral feet. Call light within reach and patient verbalizes and has demonstrated understanding of use.

## 2018-04-06 NOTE — PROGRESS NOTES
TRANSFER - OUT REPORT:    Verbal report given to Arland Gilford, RN on Miguel Mckeon  being transferred to GI lab for ordered procedure       Report consisted of patients Situation, Background, Assessment and Recommendations(SBAR). Information from the following report(s) SBAR, Kardex, Intake/Output, MAR and Recent Results was reviewed with the receiving nurse. Opportunity for questions and clarification was provided.

## 2018-04-06 NOTE — ANESTHESIA POSTPROCEDURE EVALUATION
Post-Anesthesia Evaluation and Assessment    Patient: Danette Lyons MRN: [id number]  SSN: [id number]    YOB: 1945  Age: 67 y.o. Sex: male       Cardiovascular Function/Vital Signs  Visit Vitals    /58    Pulse 76    Temp 36 °C (96.8 °F)    Resp 17    Wt 73.8 kg (162 lb 9.6 oz)    SpO2 95%    BMI 24.01 kg/m2       Patient is status post total IV anesthesia anesthesia for Procedure(s):  ESOPHAGOGASTRODUODENOSCOPY (EGD). Nausea/Vomiting: None    Postoperative hydration reviewed and adequate. Pain:  Pain Scale 1: Numeric (0 - 10) (04/06/18 1250)  Pain Intensity 1: 0 (04/06/18 1250)   Managed    Neurological Status:   Neuro  Neurologic State: Alert (04/06/18 0750)  Orientation Level: Oriented X4 (04/06/18 0750)  Cognition: Follows commands (04/06/18 0750)   At baseline    Mental Status and Level of Consciousness: awake, confused    Pulmonary Status:   O2 Device: Nasal cannula (04/06/18 1250)   Adequate oxygenation and airway patent    Complications related to anesthesia: None    Post-anesthesia assessment completed.  No immediate anesthetic concerns    Signed By: Sumeet Kaiser MD     April 6, 2018

## 2018-04-06 NOTE — PROGRESS NOTES
TRANSFER - IN REPORT:    Verbal report received from [facility], RN on Anabela Edgar being received from GI lab for routine post - op      Report consisted of patients Situation, Background, Assessment and Recommendations(SBAR). Information from the following report(s) SBAR, Kardex, Intake/Output, MAR and Recent Results was reviewed with the receiving nurse. Opportunity for questions and clarification was provided. Assessment completed upon patients arrival to unit and care assumed.

## 2018-04-06 NOTE — CONSULTS
[facility] Consult Note       Referring Physician:  Michell García, [address]    Consult Date:  4/5/2018    Admit Date:  4/5/2018    Chief Complaint:  SHARITA    Subjective:     History of Present Illness:  Patient is a 67 y.o. WM with h/o aortic stenosis, hypothyroidism, HTN, HLD, tobacco abuse, and likely COPD who is seen in consultation at the request of REESE Hardy for SHARITA. Patient admitted with increased SOB, Hgb of 5.2 (baseline 8.9-9.5), and tachycardia. CXR with cardiomegaly and interstitial prominence as well as small pleural effusion. Patient noted to  Have TIFFANIE with Cr of 1.85 up from baseline of 1.15. ECHO per cardiology pending to evaluate for critical AS. Patient currently sitting on edge of bed with minimal SOB. Patient denies N/V, increased stool frequency, diarrhea, and rectal bleeding. Positive for occasional dark stools but has taken some pepto bismol and iron supplement. Reports mild to moderate epigastric pain in recent weeks that improves with PO intake. Epigastric pain is sharp and non-radiating. Of note, patient has also taken Goody's powders in recent weeks. Patient does not take PPI as outpatient. He denies prior EGD/colonoscopy. Family history is negative for colon cancer. ROS is otherwise negative. PMH:  Past Medical History:   Diagnosis Date    Aortic valve regurgitation 11/23/2009    Chest Pain  7/6/2009    Dyslipidemia 7/6/2009    Endocrine disease     hypothyroid    Essential hypertension, benign 7/23/2009    Heart murmur 7/23/2009    Hyperlipidemia 8/8/2016    Hypothyroidism 7/6/2009    Tobacco use disorder 7/6/2009   Aortic stenosis    PSH:  No past surgical history on file. Allergies: Allergies   Allergen Reactions    Shrimp Nausea and Vomiting    Tetanus Vaccines And Toxoid Rash       Home Medications:  Prior to Admission medications    Medication Sig Start Date End Date Taking?  Authorizing Provider   furosemide (LASIX) 40 mg tablet Take  by mouth daily. Historical Provider   levothyroxine (SYNTHROID) 150 mcg tablet Take  by mouth Daily (before breakfast). Historical Provider   traZODone (DESYREL) 50 mg tablet Take 1 Tab by mouth nightly. Patient taking differently: Take 50 mg by mouth nightly. As needed 11/28/17   Nikolas Lowe MD   simvastatin (ZOCOR) 20 mg tablet Take  by mouth nightly. Historical Provider   metoprolol succinate (TOPROL-XL) 25 mg XL tablet Take  by mouth daily. Historical Provider       Hospital Medications:  Current Facility-Administered Medications   Medication Dose Route Frequency    [START ON 4/6/2018] levothyroxine (SYNTHROID) tablet 150 mcg  150 mcg Oral ACB    [START ON 4/6/2018] metoprolol succinate (TOPROL-XL) XL tablet 50 mg  50 mg Oral DAILY    simvastatin (ZOCOR) tablet 20 mg  20 mg Oral QHS    traZODone (DESYREL) tablet 50 mg  50 mg Oral QHS    sodium chloride (NS) flush 5-10 mL  5-10 mL IntraVENous PRN    nitroglycerin (NITROSTAT) tablet 0.4 mg  0.4 mg SubLINGual Q5MIN PRN    acetaminophen (TYLENOL) tablet 650 mg  650 mg Oral Q4H PRN    HYDROcodone-acetaminophen (NORCO) 7.5-325 mg per tablet 1 Tab  1 Tab Oral Q4H PRN    ondansetron (ZOFRAN ODT) tablet 4 mg  4 mg Oral Q4H PRN    furosemide (LASIX) injection 40 mg  40 mg IntraVENous BID    0.9% sodium chloride infusion 250 mL  250 mL IntraVENous PRN    furosemide (LASIX) injection 20 mg  20 mg IntraVENous ONCE    [START ON 4/6/2018] furosemide (LASIX) injection 20 mg  20 mg IntraVENous ONCE    nicotine (NICODERM CQ) 14 mg/24 hr patch 1 Patch  1 Patch TransDERmal DAILY       Social History:  Social History   Substance Use Topics    Smoking status: Current Every Day Smoker     Packs/day: 2.00    Smokeless tobacco: Never Used    Alcohol use No     Negative illicit drug use    Family History:  Negative family h/o colon cancer. Review of Systems:  A detailed 10 system ROS is obtained, with pertinent positives as listed above.   All others are negative. Objective:     Physical Exam:  Vitals:  Visit Vitals    /66 (BP 1 Location: Left arm, BP Patient Position: Sitting)    Pulse 96    Temp 97.6 °F (36.4 °C)    Resp 20    Wt 73.8 kg (162 lb 9.6 oz)    SpO2 96%    BMI 24.01 kg/m2     Gen:  NAD  HEENT: Sclerae anicteric. MMM. Neg LAD. Cardiovascular: Regular rate and rhythm. Systolic murmur RUSB, +1 LE edema bilaterally  Respiratory:  CTA bilaterally  GI: soft, ND, +BS, minimal epigastric tenderness, neg rebound/guarding  Rectal:  Deferred  Neurological:  Awake and alert. Laboratory:    Recent Labs      04/05/18   1524   WBC  7.6   HGB  5.2*   HCT  17.2*   PLT  411   MCV  79.3*   NA  142   K  5.0   CL  111*   CO2  22   BUN  54*   CREA  1.85*   CA  8.5   MG  2.5*   GLU  109*   AP  57   SGOT  11*   ALB  2.9*   TP  6.5          Assessment:       Active Problems:    Tobacco use disorder (7/6/2009)      Hypothyroidism (7/6/2009)      Nonrheumatic aortic valve stenosis (8/31/2016)      Dyspnea (8/3/6293)      Diastolic CHF, acute on chronic ([facility] [address] (4/5/2018)      Altered mental status (4/5/2018)      Patient is a 66 yo male with h/o AS, HTN, HLD, tobacco abuse, hypothyroidism, likely COPD who is here for further evaluation of anemia, tachycardia, and SOB. Of note, patient has recent epigastric pain and NSAID use raising concern for possible PUD. AS is risk factor of bleeding secondary to angiodysplasia. Iron level is low at 18 and transferring sat is 5%. Haptoglobin is elevated. TIFFANIE with Cr 1.85 noted. Patient is being transfused pRBC. ECHO evaluation pending. Possible candidate for TAVR. No prior EGD/colonoscopy. Plan:       1. NPO after midnight  2. Protonix 40 mg PO BID  3. Monitor Hgb and transfuse for goal hgb 8-9.   4. Will reassess in AM for possible EGD evaluation. Recent history raises concern for PUD. 5. Avoid NSAIDs/heparin/lovenox  6.  Will follow    Declan Arizmendi MD  [facility], [place]

## 2018-04-06 NOTE — PROGRESS NOTES
[facility] CARDIOLOGY PROGRESS NOTE           4/6/2018 9:11 AM    Admit Date: 4/5/2018      Subjective:   Patient feels better after 2 units of PRBC and 2.5 liter diuresis. Plans for EGD today. Hgb up to 7.4. Renal function stable. Critical aortic stenosis. ROS:  Cardiovascular:  As noted above    Objective:      Vitals:    04/06/18 0234 04/06/18 0334 04/06/18 0430 04/06/18 0807   BP: 103/63 101/62 117/80    Pulse: 83 80 92    Resp: 20 20     Temp: 97.3 °F (36.3 °C) 97.7 °F (36.5 °C) 97.7 °F (36.5 °C)    SpO2: 97% 100% 97% 96%   Weight:           Physical Exam:  General-No Acute Distress  Neck- supple, no JVD  CV- regular rate and rhythm Grade III/VI DEE  Lung- clear bilaterally  Abd- soft, nontender, nondistended  Ext- no edema bilaterally. Skin- warm and dry      Data Review:   Recent Labs      04/06/18   0542  04/05/18   1524   NA  142  142   K  4.1  5.0   MG   --   2.5*   BUN  46*  54*   CREA  1.77*  1.85*   GLU  108*  109*   WBC  7.6  7.6   HGB  7.4*  5.2*   HCT  23.3*  17.2*   PLT  376  411      No results found for: Bal Dianne, TROPT, TNIPOC    Echo (4/5/18):  LEFT VENTRICLE: Size was normal. Systolic function was mildly reduced. Ejection fraction was estimated in the range of 40 % to 45 %. This study was inadequate  for the evaluation of regional wall motion. Wall thickness was normal. Avg. E/e'= 84.84 (grade 2 diastolic dysfunction). RIGHT VENTRICLE: The ventricle was mildly dilated. Systolic function was normal. Estimated peak pressure was in the range of 85-90 mmHg. AORTIC VALVE: The valve was probably trileaflet. The mean pressure gradient was 73 mmHg. The findings were most consistent with severe aortic stenosis. The  peak pressure gradient was 135 mmHg. Di= 0.166. There was moderate regurgitation. Assessment/Plan:     Active Problems:    Tobacco use disorder (7/6/2009)    Smoking cessation discussed        Hypothyroidism (7/6/2009)    On synthroid.         Nonrheumatic aortic valve stenosis (8/31/2016)    Critical aortic stenosis. Will need coronary angiography and AVR once Hgb stable and GI evaluation complete. Dyspnea (4/5/2018)    Improved. Systolic CHF, acute on chronic (HCC) (4/5/2018)    Decrease lasix to once daily. Monitor volume status closely. Daily BMP. Altered mental status (4/5/2018)    Appears improved. Symptomatic anemia (4/5/2018)    Transfuse 1 unit PRBC today. STart iron. Severe pulmonary HTN  Will need RHC.                 Tenisha Holman MD  4/6/2018 9:11 AM

## 2018-04-06 NOTE — PROGRESS NOTES
Bedside and Verbal shift change report given to Geremias Brooks RN (oncoming nurse) by self [facility] nurse).  Report included the following information SBAR, Kardex, ED Summary, Procedure Summary, Intake/Output, MAR, Recent Results and Cardiac Rhythm SR.

## 2018-04-06 NOTE — PROGRESS NOTES
Patient seen this morning. Denies any abdominal pain, nausea, or vomiting this morning. HGB up to 7.4. Cardiology planning to transfuse 1 more unit of PRBC today. Cardiology is ok with him having EGD today. We will plan for EGD to evaluate for upper sources of SHARITA including PUD, gastritis, gastric malignancy, AVM. Discussed risk of procedure with patient to include risk of infection, bleeding, perforation, sedation reaction, and death. He voiced understanding and wishes to plan for procedure. Nikita Martinez NP  [facility].

## 2018-04-06 NOTE — PROGRESS NOTES
PT Note:  PT orders received/reviewed and evaluation attempted. Patient was off the floor. Evaluation will be re-attempted as schedule and patient's status allow.   Thanks,  Harvinder Corcoran, PT, DPT

## 2018-04-06 NOTE — PROGRESS NOTES
Problem: Falls - Risk of  Goal: *Absence of Falls  Document Vera Fall Risk and appropriate interventions in the flowsheet.    Outcome: Progressing Towards Goal  Fall Risk Interventions:  Mobility Interventions: Bed/chair exit alarm         Medication Interventions: Patient to call before getting OOB, Teach patient to arise slowly

## 2018-04-06 NOTE — PROGRESS NOTES
LMSW consulted for D/C planning. Unable to meet with pt as he was busy with procedures/testing. Reviewed chart. Per chart pt lives alone, never  with no children. Pt has AMS/?dementia per chart. Patient's nephew Kati Smiling [phone]) , his step-brother Maurilio Richmond [phone]) and some neighbors try to assist pt. Will follow plan of care and assist further as exact needs are known.

## 2018-04-06 NOTE — PROGRESS NOTES
Hospitalist Progress Note    2018  Admit Date: 2018  1:38 PM   NAME: Tony    :  1945   MRN:  [id number]   Attending: Ashley Ibarra MD  PCP:  Yesenia Esquivel MD    SUBJECTIVE:   66 y/o gentleman with hx hypothyroidism, aortic stenosis, smoking, HTN, HLD is admitted for acute CHF and symptomatic anemia. He is found to have a Hgb of 5.2 with microcytic indices, low iron of 18, and a BNP of 1510. He was at the [facility] office earlier today and was very anxious because he was so short of breath. Reportedly also had some behavioral outbursts as well and episodes of confusion. A family member is present in room and he states patient will sometimes get this way if he is not \"getting his way\". Feels he is also craving nicotine due to his tobacco dependence. No reported hx dementia or cognitive deficits. He is hypothyroid. Hospitalist service consulted for workup and evaluation of intermittent confusion and AMS.  - pt speaking in loud voice, hard of hearing. Oriented but did repeat medical plan three times. Son at bedside. Review of Systems negative with exception of pertinent positives noted above  PHYSICAL EXAM     Visit Vitals    /86 (BP 1 Location: Right arm, BP Patient Position: At rest)    Pulse 92    Temp 97.6 °F (36.4 °C)    Resp 18    Wt 73.8 kg (162 lb 9.6 oz)    SpO2 99%    BMI 24.01 kg/m2      Temp (24hrs), Av.7 °F (36.5 °C), Min:96.5 °F (35.8 °C), Max:99.1 °F (37.3 °C)    Oxygen Therapy  O2 Sat (%): 99 % (18)  Pulse via Oximetry: 74 beats per minute (18 1227)  O2 Device: Nasal cannula (18)  O2 Flow Rate (L/min): 2 l/min (18 1703)    Intake/Output Summary (Last 24 hours) at 18 1730  Last data filed at 18 1318   Gross per 24 hour   Intake              835 ml   Output             3860 ml   Net            -3025 ml      General: No acute distress    Lungs:  CTA Bilaterally.    Heart:  Regular rate and rhythm,  No murmur, rub, or gallop  Abdomen: Soft, Non distended, Non tender, Positive bowel sounds  Extremities: No cyanosis, clubbing or edema  Neurologic:  No focal deficits    ASSESSMENT      Active Hospital Problems    Diagnosis Date Noted    Dyspnea 57/63/0196    Diastolic CHF, acute on chronic ([facility] [address] 04/05/2018    Altered mental status 04/05/2018    Symptomatic anemia 04/05/2018    Nonrheumatic aortic valve stenosis 08/31/2016    Hypothyroidism 07/06/2009    Tobacco use disorder 07/06/2009     Plan:  Patient consulted for AMS. Workup included normal CT head, normal electrolyte panel and UA unremarkable. TSH was low at 0.288 suggesting over treatment with synthroid. Dose reduced and will need repeat TSH/t4 in 4-6 weeks. I do suspect degree of mild dementia underlying but no indication for rehab admission based upon this at current time. Pt has been found to have gastric ulcers thought secondary to goody's powder and is currently undergoing PRBC transfusion. Plan noted for CT angiography and likely AVR. Cont trazadone 50mg qhs    Hospitalist will sign off.  Please re-call if needed      Signed By: Hyla Alpers, DO     April 6, 2018

## 2018-04-06 NOTE — PROGRESS NOTES
Bedside and Verbal shift change report given to Pooja Avila RN (oncoming nurse) by self [facility] nurse). Report included the following information SBAR, Kardex, Intake/Output, MAR and Recent Results.

## 2018-04-06 NOTE — PROGRESS NOTES
Bedside and Verbal shift change report given to self (oncoming nurse) by London Valiente RN (offgoing nurse). Report included the following information SBAR, Kardex, Intake/Output, MAR and Recent Results.

## 2018-04-06 NOTE — PROGRESS NOTES
Problem: Mobility Impaired (Adult and Pediatric)  Goal: *Acute Goals and Plan of Care (Insert Text)  LTG:  (1.)Mr. Nile Dillard will move from supine to sit and sit to supine , scoot up and down and roll side to side in bed with INDEPENDENCE within 7 treatment day(s). (2.)Mr. Nile Dillard will transfer from bed to chair and chair to bed with MODIFIED INDEPENDENCE using the least restrictive device within 7 treatment day(s). (3.)Mr. Nile Dillard will ambulate with MODIFIED INDEPENDENCE for 500 feet with the least restrictive device within 7 treatment day(s). (4.)Mr. Nile Dillard will participate in therapeutic activity/exerices x 23 minutes for increased activity tolerance within 7 days. ________________________________________________________________________________________________      PHYSICAL THERAPY: Initial Assessment, PM 4/6/2018  INPATIENT: Hospital Day: 2  Payor: Carlos Stevenson / Plan: Atif Goldberg / Product Type: [employer] /      NAME/AGE/GENDER: Miguel Mckeon is a 67 y.o. male   PRIMARY DIAGNOSIS: Anemia, unspecified type [D64.9] <principal problem not specified> <principal problem not specified>  Procedure(s) (LRB):  ESOPHAGOGASTRODUODENOSCOPY (EGD) (N/A)  Day of Surgery  ICD-10: Treatment Diagnosis:    · Generalized Muscle Weakness (M62.81)   Precaution/Allergies:  Shrimp and Tetanus vaccines and toxoid      ASSESSMENT:     Mr. Nile Dillard is a 67 y.o. male in the hospital for the above who was supine in bed upon arrival getting blood. Pt reports that he lives alone in a one story house and is independent with ADLs/ambulation. Pt presents to PT with weakness in B hip flexors and decreased PROM in  B knee extension. Pt performed bed mobility with modified independence. He stood with CGA-Papo and has good to fair standing balance. Pt marched in place with CGA-Papo with reports of some increased dizziness.   BP recorded after pt resumed supine position with no hypotension observed. Pt appears to be functioning slightly below baseline and could benefit from skilled PT. This section established at most recent assessment   PROBLEM LIST (Impairments causing functional limitations):  1. Decreased Strength  2. Decreased Transfer Abilities  3. Decreased Ambulation Ability/Technique  4. Decreased Balance   INTERVENTIONS PLANNED: (Benefits and precautions of physical therapy have been discussed with the patient.)  1. Balance Exercise  2. Bed Mobility  3. Family Education  4. Gait Training  5. Therapeutic Activites  6. Therapeutic Exercise/Strengthening  7. Transfer Training  8. Group Therapy     TREATMENT PLAN: Frequency/Duration: 3 times a week for duration of hospital stay  Rehabilitation Potential For Stated Goals: Good     RECOMMENDED REHABILITATION/EQUIPMENT: (at time of discharge pending progress): Due to the probability of continued deficits (see above) this patient will not likely need continued skilled physical therapy after discharge. Equipment:    None at this time              HISTORY:   History of Present Injury/Illness (Reason for Referral):  Per H&P: \"  HPI  Mario Álvarez is a 67 y. o. male seen for a follow up visit regarding   Aortic Stenosis (Dr. Mely Badillo pt last seen 03-13-17.  Pt could not stand to weigh.); Hypertension; and Shortness of Breath     He is worked in for shortness of breath. He has recently seen Dr Tracy Wynne and started on diuretic antibiotics. [facility] is still having dyspnea and is moaning groaning loudly today. [facility] has not been resting at night and stays up in a chair. [facility] has 24/7 care.  [facility] feels like he has sinus congestion and has pressure on his chest with worsening dyspnea.            Past Medical History, Past Surgical History, Family history, Social History, and Medications were all reviewed with the patient today and updated as necessary.  \"  Past Medical History/Comorbidities:   Mr. Brittni Hall  has a past medical history of Aortic valve regurgitation (11/23/2009); Chest Pain  (7/6/2009); Dyslipidemia (7/6/2009); Endocrine disease; Essential hypertension, benign (7/23/2009); Heart murmur (7/23/2009); Hyperlipidemia (8/8/2016); Hypothyroidism (7/6/2009); and Tobacco use disorder (7/6/2009). He also has no past medical history of Stroke [facility]). Mr. Tavo Bae  has no past surgical history on file. Social History/Living Environment:   Home Environment: Private residence  # Steps to Enter: 3  One/Two Story Residence: One story  Living Alone: Yes  Support Systems: Friends \ neighbors  Patient Expects to be Discharged to[de-identified] Unknown  Current DME Used/Available at Home: None  Tub or Shower Type: Tub/Shower combination  Prior Level of Function/Work/Activity:  Pt reports that he lives alone in a one level house. Independent with ADLs and ambulation PTA. Number of Personal Factors/Comorbidities that affect the Plan of Care:  Lives alone 3+: HIGH COMPLEXITY   EXAMINATION:   Most Recent Physical Functioning:   Gross Assessment:  AROM: Within functional limits  PROM: Generally decreased, functional (Decreased B knee ext)  Strength: Generally decreased, functional (B hip flexion 4/5)               Posture:     Balance:  Sitting: Intact  Standing: Impaired  Standing - Static: Good  Standing - Dynamic : Fair Bed Mobility:  Supine to Sit: Modified independent  Sit to Supine: Modified independent  Wheelchair Mobility:     Transfers:  Sit to Stand: Contact guard assistance;Minimum assistance  Stand to Sit: Contact guard assistance  Gait:            Body Structures Involved:  1. Heart  2. Muscles Body Functions Affected:  1. Cardio  2. Neuromusculoskeletal  3. Movement Related Activities and Participation Affected:  1. Mobility  2. Domestic Life  3.  Community, Social and [place]   Number of elements that affect the Plan of Care: 4+: HIGH COMPLEXITY   CLINICAL PRESENTATION:   Presentation: Stable and uncomplicated: LOW COMPLEXITY   CLINICAL DECISION MAKING: [facility] Mobility Inpatient Short Form  How much difficulty does the patient currently have. .. Unable A Lot A Little None   1. Turning over in bed (including adjusting bedclothes, sheets and blankets)? [] 1   [] 2   [] 3   [x] 4   2. Sitting down on and standing up from a chair with arms ( e.g., wheelchair, bedside commode, etc.)   [] 1   [] 2   [x] 3   [] 4   3. Moving from lying on back to sitting on the side of the bed? [] 1   [] 2   [] 3   [x] 4   How much help from another person does the patient currently need. .. Total A Lot A Little None   4. Moving to and from a bed to a chair (including a wheelchair)? [] 1   [] 2   [x] 3   [] 4   5. Need to walk in hospital room? [] 1   [] 2   [x] 3   [] 4   6. Climbing 3-5 steps with a railing? [] 1   [] 2   [x] 3   [] 4   © 2007, Trustees of [address] [place] [address] [address] [id number], under license to [employer]. All rights reserved      Score:  Initial: 20 Most Recent: X (Date: -- )    Interpretation of Tool:  Represents activities that are increasingly more difficult (i.e. Bed mobility, Transfers, Gait). Score 24 23 22-20 19-15 14-10 9-7 6     Modifier CH CI CJ CK CL CM CN      ? Mobility - Walking and Moving Around:     - CURRENT STATUS: CJ - 20%-39% impaired, limited or restricted    - GOAL STATUS: CI - 1%-19% impaired, limited or restricted    - D/C STATUS:  ---------------To be determined---------------  Payor:  Master / Plan: Jyoti North / Product Type: [employer] Medicare /      Medical Necessity:     · Patient demonstrates good rehab potential due to higher previous functional level. Reason for Services/Other Comments:  · Patient continues to require skilled intervention due to decreaed functional mobility and balance.    Use of outcome tool(s) and clinical judgement create a POC that gives a: Clear prediction of patient's progress: LOW COMPLEXITY            TREATMENT:   (In addition to Assessment/Re-Assessment sessions the following treatments were rendered)   Pre-treatment Symptoms/Complaints:  Dizziness with change in position  Pain: Initial:   Pain Intensity 1: 0  Post Session:  0     Assessment/Reassessment only, no treatment provided today    Braces/Orthotics/Lines/Etc:   · IV  · O2 Device: Nasal cannula  Treatment/Session Assessment:    · Response to Treatment:  Tolerated fairly given increased dizziness with mobility. · Interdisciplinary Collaboration:   o Physical Therapist  o Registered Nurse  · After treatment position/precautions:   o Supine in bed  o Bed alarm/tab alert on  o Bed/Chair-wheels locked  o Bed in low position  o Call light within reach   · Compliance with Program/Exercises: Will assess as treatment progresses. · Recommendations/Intent for next treatment session: \"Next visit will focus on advancements to more challenging activities and reduction in assistance provided\".   Total Treatment Duration:  PT Patient Time In/Time Out  Time In: 1615  Time Out: [address] Pradhan PT, DPT

## 2018-04-06 NOTE — ROUTINE PROCESS
CHF teaching held; sleeping. Berto @ BS and will follow.     Start FR post NPO  Palliative Care: RATT 12, none

## 2018-04-06 NOTE — PROGRESS NOTES
Attempted visit by volunteer    Iglesia Coleman, staff Janis doan 12, [facility]  /   [email or web address]

## 2018-04-06 NOTE — PROCEDURES
Esophagogastroduodenoscopy    DATE of PROCEDURE: 4/6/2018    INDICATION: SHARITA    POSTPROCEDURE DIAGNOSIS: gastric ulcers    MEDICATIONS ADMINISTERED: MAC anesthesia (see anesthesia report)    INSTRUMENT:    PROCEDURE:  After obtaining informed consent, the patient was placed in the left lateral position and sedated. The endoscope was advanced under direct vision without difficulty. The esophagus, stomach (including retroflexed views) and duodenum were evaluated. The patient was taken to the recovery area in stable condition. FINDINGS:  ESOPHAGUS: normal with no esophagitis/Menjivar's  STOMACH: moderate hiatal hernia; in the antrum are three fibrin based ulcers with no visible vessel. DUODENUM: duodenitis. Estimated blood loss: 0-minimal   Specimens obtained during procedure: none    PLAN:  Probable Goody powder . Check H pylori serologies. Advance diet. If clinically stable, OK from GI point of view to go home tomorrow but will defer to Cardiology re: critical AS. I will have our office arrange for office followup to reassess his PUD. Outpatient Prilosec 20 mg every day. Will sign off, but please call if questions.

## 2018-04-06 NOTE — PROGRESS NOTES
Bedside and Verbal shift change report given to self (oncoming nurse) by Yohannes Scherer, RN and Silvana Pacheco, JANNY (offgoing nurses). Report included the following information SBAR, Kardex, ED Summary, Procedure Summary, Intake/Output, MAR, Recent Results and Cardiac Rhythm SR. First unit of blood hung with off-going RNs. On-coming night shift RN will stay in room and monitor patient for the first 15 minutes and take VS hourly.

## 2018-04-06 NOTE — PROGRESS NOTES
TRANSFER - OUT REPORT:    Verbal report given to Archana RN(name) on Danette Lyons  being transferred to [facility](unit) for ordered procedure   EGD    Report consisted of patients Situation, Background, Assessment and   Recommendations(SBAR). Information from the following report(s) SBAR and MAR was reviewed with the receiving nurse. Lines:   Peripheral IV 04/06/18 Right Hand (Active)        Opportunity for questions and clarification was provided.       Patient transported with:   O2 @ 2 liters and transporter

## 2018-04-06 NOTE — ANESTHESIA PREPROCEDURE EVALUATION
Anesthetic History               Review of Systems / Medical History  Patient summary reviewed and pertinent labs reviewed    Pulmonary          Smoker         Neuro/Psych             Comments: History of altered mental status Cardiovascular    Hypertension  Valvular problems/murmurs: mitral insufficiency and aortic stenosis    CHF (EF 40-45%)    Hyperlipidemia  Pertinent negatives: Angina: Ef 40-45%  Exercise tolerance: <4 METS  Comments: Critical AS and severe MR, moderate mitral stenosis, severe pulmonary HTN, grade 2 diastolic dysfunction   GI/Hepatic/Renal                Endo/Other      Hypothyroidism  Anemia     Other Findings            Physical Exam    Airway  Mallampati: I  TM Distance: 4 - 6 cm  Neck ROM: normal range of motion   Mouth opening: Normal     Cardiovascular    Rhythm: regular  Rate: normal    Murmur: Grade 4, Aortic area     Dental    Dentition: Edentulous     Pulmonary    Rhonchi        Prolonged expiration     Abdominal         Other Findings            Anesthetic Plan    ASA: 4  Anesthesia type: total IV anesthesia          Induction: Intravenous  Anesthetic plan and risks discussed with: Patient      Critically ill, high risk patient

## 2018-04-06 NOTE — PROGRESS NOTES
TRANSFER - IN REPORT:    Verbal report received from Shira Cardozo RN (name) on Saeed Kaur  being received from room 326 (unit) for ordered procedure. Report consisted of patients Situation, Background, Assessment and   Recommendations(SBAR). Information from the following report(s) SBAR was reviewed with the receiving nurse. Opportunity for questions and clarification was provided. Awaiting transport to bring pt to the GI Lab at this time.

## 2018-04-07 NOTE — PROGRESS NOTES
Bedside and Verbal shift change report given to [facility] (oncoming nurse) by self (offgoing nurse). Report included the following information SBAR, Kardex, MAR and Recent Results.

## 2018-04-07 NOTE — PROGRESS NOTES
Bedside and Verbal shift change report given to self (oncoming nurse) by Alicia Wilson (offgoing nurse). Report included the following information SBAR, Kardex, MAR and Recent Results.

## 2018-04-07 NOTE — PROGRESS NOTES
Bedside and Verbal shift change report given to Abram [facility] (oncoming nurse) by Erin Orozco RN (offgoing nurse). Report included the following information SBAR, Kardex, Accordion and Recent Results.

## 2018-04-07 NOTE — PROGRESS NOTES
[facility] CARDIOLOGY PROGRESS NOTE           4/7/2018 9:11 AM    Admit Date: 4/5/2018      Subjective:   Patient denies chest pain. Dyspnea improved. Hgb up to 8.1. EGD with ulcers. Renal function marginally improved. ROS:  Cardiovascular:  As noted above    Objective:      Vitals:    04/06/18 2037 04/07/18 0107 04/07/18 0457 04/07/18 0903   BP: 113/56 90/58 108/65 99/49   Pulse: 87 64 (!) 53 68   Resp: 18 18 18 17   Temp: 97.2 °F (36.2 °C) 97.5 °F (36.4 °C) 97.9 °F (36.6 °C) 97.9 °F (36.6 °C)   SpO2: 94% 97% 90% 96%   Weight:   76.6 kg (168 lb 14.4 oz)        Physical Exam:  General-No Acute Distress  Neck- supple, no JVD  CV- regular rate and rhythm Grade III/VI DEE  Lung- clear bilaterally  Abd- soft, nontender, nondistended  Ext- no edema bilaterally. Skin- warm and dry      Data Review:   Recent Labs      04/07/18   0321  04/06/18   0542  04/05/18   1524   NA  140  142  142   K  3.7  4.1  5.0   MG   --    --   2.5*   BUN  38*  46*  54*   CREA  1.57*  1.77*  1.85*   GLU  100  108*  109*   WBC   --   7.6  7.6   HGB  8.1*  7.4*  5.2*   HCT   --   23.3*  17.2*   PLT   --   376  411   INR  1.1   --    --       No results found for: Theresa Comings, TROPT, TNIPOC    Echo (4/5/18):  LEFT VENTRICLE: Size was normal. Systolic function was mildly reduced. Ejection fraction was estimated in the range of 40 % to 45 %. This study was inadequate  for the evaluation of regional wall motion. Wall thickness was normal. Avg. E/e'= 45.15 (grade 2 diastolic dysfunction). RIGHT VENTRICLE: The ventricle was mildly dilated. Systolic function was normal. Estimated peak pressure was in the range of 85-90 mmHg. AORTIC VALVE: The valve was probably trileaflet. The mean pressure gradient was 73 mmHg. The findings were most consistent with severe aortic stenosis. The  peak pressure gradient was 135 mmHg. Di= 0.166. There was moderate regurgitation.       Assessment/Plan:     Active Problems:    Tobacco use disorder (7/6/2009)    Smoking cessation discussed        Hypothyroidism (7/6/2009)    On synthroid. Nonrheumatic aortic valve stenosis (8/31/2016)    Critical aortic stenosis. Will need AVR. Will plan LHC on Monday if stable and then CT surgery evaluation. Dyspnea (4/5/2018)    Improved. Systolic CHF, acute on chronic (HCC) (4/5/2018)    DON IV lasix. Monitor volume status closely. Daily BMP. Altered mental status (4/5/2018)    Appears improved. Symptomatic anemia (4/5/2018)    Transfuse 1 unit PRBC today. Continue iron. Severe pulmonary HTN  Will need RHC.                 Checo Pisano MD  4/7/2018 9:11 AM

## 2018-04-08 NOTE — PROGRESS NOTES
Bedside and Verbal shift change report given to self (oncoming nurse) by Eladia Cheung (offgoing nurse). Report included the following information SBAR, Kardex, MAR and Recent Results.

## 2018-04-08 NOTE — PROGRESS NOTES
Bedside and Verbal shift change report given to Tho Lowery (oncoming nurse) by self (offgoing nurse). Report included the following information SBAR, Kardex, MAR and Recent Results.

## 2018-04-08 NOTE — PROGRESS NOTES
Bedside and Verbal shift change report given to Abram [facility] (oncoming nurse) by John Chin RN (offgoing nurse). Report included the following information SBAR, Kardex, Accordion and Recent Results.

## 2018-04-08 NOTE — PROGRESS NOTES
[facility] CARDIOLOGY PROGRESS NOTE           4/8/2018 9:11 AM    Admit Date: 4/5/2018      Subjective:   Patient denies chest pain. Mild dyspnea. Hgb up to 8.7. EGD with ulcers. Renal function marginally improved. ROS:  Cardiovascular:  As noted above    Objective:      Vitals:    04/07/18 1300 04/07/18 2101 04/08/18 0008 04/08/18 0509   BP: 120/78 102/62 101/46 100/62   Pulse: 78 92 97 81   Resp: 18 18 18 18   Temp: 97.6 °F (36.4 °C) 98.1 °F (36.7 °C) 98 °F (36.7 °C) 98.2 °F (36.8 °C)   SpO2: 95% 96% 95% 97%   Weight:    76.2 kg (168 lb)       Physical Exam:  General-No Acute Distress  Neck- supple, no JVD  CV- regular rate and rhythm Grade III/VI DEE  Lung- clear bilaterally  Abd- soft, nontender, nondistended  Ext- no edema bilaterally. Skin- warm and dry      Data Review:   Recent Labs      04/08/18   0351  04/07/18   0321  04/06/18   0542  04/05/18   1524   NA  139  140  142  142   K  3.4*  3.7  4.1  5.0   MG   --    --    --   2.5*   BUN  27*  38*  46*  54*   CREA  1.31  1.57*  1.77*  1.85*   GLU  93  100  108*  109*   WBC  7.7   --   7.6  7.6   HGB  8.7*  8.1*  7.4*  5.2*   HCT  27.3*   --   23.3*  17.2*   PLT  349   --   376  411   INR   --   1.1   --    --       No results found for: [place], TROPT, TNIPOC    Echo (4/5/18):  LEFT VENTRICLE: Size was normal. Systolic function was mildly reduced. Ejection fraction was estimated in the range of 40 % to 45 %. This study was inadequate  for the evaluation of regional wall motion. Wall thickness was normal. Avg. E/e'= 40.23 (grade 2 diastolic dysfunction). RIGHT VENTRICLE: The ventricle was mildly dilated. Systolic function was normal. Estimated peak pressure was in the range of 85-90 mmHg. AORTIC VALVE: The valve was probably trileaflet. The mean pressure gradient was 73 mmHg. The findings were most consistent with severe aortic stenosis. The  peak pressure gradient was 135 mmHg. Di= 0.166.  There was moderate regurgitation. Assessment/Plan:     Active Problems:    Tobacco use disorder (7/6/2009)    Smoking cessation discussed        Hypothyroidism (7/6/2009)    On synthroid. Nonrheumatic aortic valve stenosis (8/31/2016)    Critical aortic stenosis. Will need AVR. Will plan LHC on Monday if stable and then CT surgery evaluation. Dyspnea (4/5/2018)    Improved. Systolic CHF, acute on chronic (HCC) (4/5/2018)    DON IV lasix. Monitor volume status closely. Daily BMP. Altered mental status (4/5/2018)    Appears improved. Symptomatic anemia (4/5/2018)    Iv iron today. PO iron ordered. Severe pulmonary HTN  Will need RHC.                 Linsey Singh MD  4/8/2018 9:11 AM

## 2018-04-09 NOTE — PROGRESS NOTES
Care Management Interventions  PCP Verified by CM: Yes  Palliative Care Criteria Met (RRAT>21 & CHF Dx)?: No  Transition of Care Consult (CM Consult): Discharge Planning  Current Support Network: Other  Confirm Follow Up Transport: Friends  Plan discussed with Pt/Family/Caregiver: Yes  Freedom of Choice Offered: Yes  Discharge Location  Discharge Placement: Home with home health  Met with patient for d/c planning. Patient alert and oriented x 3, independent of ADL's and lives with a friend named Chris Concepcion who is the uncle of his durable power of  Shon Abbasi [phone]. Copy of POA is in chart and does say that Shon Abbasi is health, medical, hospitalization and general welfare and placement of patient. Patient states for CM to call Caroline Lyons for any additional information that is needed. Patient has walking stick at home but does not use it. He is able to obtain his medications without difficulty and his pharmacy is [facility]. Spoke with Shon Abbasi and he confirms his Uncle stays with patient and takes care of him and provides needed services such as transportation, cooking, assist with ADL's as needed and makes sure he obtains his medications. Per Caroline Points and patient they want to return home when medically stable. Agreeable to Home Health if needed at discharge. Current d/c plan is home with home health. CM following.

## 2018-04-09 NOTE — PROGRESS NOTES
PT Note:     Patient's chart reviewed and treatment attempted this PM. RN requested patient be held given recent heart cath. Will re-attempt pending schedule and patient status.      Thanks,   Arnol Chamberlain, SPT

## 2018-04-09 NOTE — PROGRESS NOTES
TRANSFER - OUT REPORT:    [facility] (name[facility] on Nancy Acosta  being transferred to room 326 (unit) for routine progression of care       Report consisted of patients Situation, Background, Assessment and   Recommendations(SBAR). Information from the following report(s) SBAR was reviewed with the receiving nurse. Lines:   Peripheral IV 04/06/18 Right Hand (Active)   Site Assessment Clean, dry, & intact 4/9/2018  6:55 AM   Phlebitis Assessment 0 4/9/2018  6:55 AM   Infiltration Assessment 0 4/9/2018  6:55 AM   Dressing Status Clean, dry, & intact 4/9/2018  6:55 AM   Dressing Type Tape;Transparent 4/9/2018  6:55 AM   Hub Color/Line Status Patent 4/9/2018  6:55 AM       Peripheral IV 04/07/18 Right Forearm (Active)   Site Assessment Clean, dry, & intact 4/9/2018  6:55 AM   Phlebitis Assessment 0 4/9/2018  6:55 AM   Infiltration Assessment 0 4/9/2018  6:55 AM   Dressing Status Clean, dry, & intact 4/9/2018  6:55 AM   Dressing Type Tape;Transparent 4/9/2018  6:55 AM   Hub Color/Line Status Patent 4/9/2018  6:55 AM        Opportunity for questions and clarification was provided. Patient transported with:   O2 @ 4 liters  Registered Nurse  Tech         Pt had LHC via [address] and [address] via RBV. RRA site closed with TR band and 13mL @ 1335. RBV site closed with manual compression x 10min and covered with gauze and tegaderm. Pt received Versed 3mg IV and Fentanyl 100mcg IV.

## 2018-04-09 NOTE — ROUTINE PROCESS
CHF teaching started post introduction to pt/family; aware of diagnosis. Planner/scale @ BS and will follow. Smoking/ ETOH/Illicit drug use cessation and maintain a healthy weight covered. Pt/family aware that I can not prescribe nor adjust  medications: 15mins  Palliative Care score: DCing now  Start 2L/D Fluid restriction  CHF teaching continues to pt/family. Emphasis on taking prescription meds as ordered, to keep F/U appts and to call MD STAT.     [place]    Needs community services  @ DC.

## 2018-04-09 NOTE — PROGRESS NOTES
Right Radial compression band removed. After slowly reducing air from 13 cc to zero as per hospital protocol. No bleeding or hematoma noted. 2 x 2 gauze with tegaderm placed over puncture site. The affected extremity is warm and dry to the touch. Frequent vital signs documented per flowsheet. Patient instructed to call if any bleeding noted on gauze. Patient verbalized understanding the nursing instructions.

## 2018-04-09 NOTE — PROGRESS NOTES
Bedside and Verbal shift change report given to Abram [facility] (oncoming nurse) by Tosha Hoskins RN (offgoing nurse). Report included the following information SBAR, Kardex, Accordion and Recent Results.

## 2018-04-09 NOTE — PROGRESS NOTES
Bedside and Verbal shift change report given to self (oncoming nurse) by Casimir Simmonds (offgoing nurse). Report included the following information SBAR, Kardex, MAR and Recent Results.

## 2018-04-09 NOTE — PROGRESS NOTES
[facility] CARDIOLOGY PROGRESS NOTE           4/9/2018 9:11 AM    Admit Date: 4/5/2018      Subjective:   Patient denies chest pain. Lying flat in bed on 3 liters. Hgb down to  8.2. EGD with ulcers. Renal function back to normal.     ROS:  Cardiovascular:  As noted above    Objective:      Vitals:    04/08/18 1716 04/08/18 1949 04/09/18 0013 04/09/18 0443   BP: 100/50 129/64 111/65 107/68   Pulse: 73 84 75 86   Resp: 17 18 18 18   Temp: 97.2 °F (36.2 °C) 98.5 °F (36.9 °C) 98.2 °F (36.8 °C) 98.2 °F (36.8 °C)   SpO2: 96% 98% 96% 95%   Weight:    77.7 kg (171 lb 4.8 oz)       Physical Exam:  General-No Acute Distress  Neck- supple, no JVD  CV- regular rate and rhythm Grade III/VI DEE  Lung- clear bilaterally  Abd- soft, nontender, nondistended  Ext- no edema bilaterally. Skin- warm and dry      Data Review:   Recent Labs      04/09/18   0358  04/08/18   0351  04/07/18   0321   NA  141  139  140   K  3.8  3.4*  3.7   BUN  21  27*  38*   CREA  1.17  1.31  1.57*   GLU  92  93  100   WBC  7.6  7.7   --    HGB  8.2*  8.7*  8.1*   HCT  26.6*  27.3*   --    PLT  340  349   --    INR   --    --   1.1      No results found for: BRIANA Archuleta Mai, DORIS    Echo (4/5/18):  LEFT VENTRICLE: Size was normal. Systolic function was mildly reduced. Ejection fraction was estimated in the range of 40 % to 45 %. This study was inadequate  for the evaluation of regional wall motion. Wall thickness was normal. Avg. E/e'= 66.59 (grade 2 diastolic dysfunction). RIGHT VENTRICLE: The ventricle was mildly dilated. Systolic function was normal. Estimated peak pressure was in the range of 85-90 mmHg. AORTIC VALVE: The valve was probably trileaflet. The mean pressure gradient was 73 mmHg. The findings were most consistent with severe aortic stenosis. The  peak pressure gradient was 135 mmHg. Di= 0.166. There was moderate regurgitation.       Assessment/Plan:     Active Problems:    Tobacco use disorder (7/6/2009) Smoking cessation discussed        Hypothyroidism (7/6/2009)    On synthroid. Nonrheumatic aortic valve stenosis (8/31/2016)    Critical aortic stenosis. Will need AVR. Will plan C nad RHC today then CT surgery evaluation. Dyspnea (4/5/2018)    Improved. Systolic CHF, acute on chronic (HCC) (4/5/2018)   Hold IV lasix today. Monitor volume status closely. Daily BMP. Altered mental status (4/5/2018)    Appears improved. Symptomatic anemia (4/5/2018)     PO iron ordered. Severe pulmonary HTN  Will need RHC.                 Estrella May MD  4/9/2018 9:11 AM

## 2018-04-09 NOTE — PROCEDURES
[address] CATH    Rylie Argueta  MR#: [id number]  : 1945  ACCOUNT #: [de-identified]   DATE OF SERVICE: 2018    REFERRING PHYSICIAN:  Ramon Watson MD     INDICATIONS:  This is a 76-year-old male with multiple medical problems, who is a poor candidate for surgery, who presents with signs and symptoms concerning for severe aortic stenosis pre-valve replacement workup. ESTIMATED BLOOD LOSS: Less than 5 mL. SEDATION: The patient was given 3 mg of Versed and 100 mcg of fentanyl, starting at 1305 and ending at 1339 by nurse [facility]. SPECIMENS:  None. COMPLICATIONS:  None. ASSISTANT: None. Preprocedure timeout was completed. MALLAMPATI:  3. ASA:  3.    DESCRIPTION OF PROCEDURE:  After informed consent, the patient was prepped and draped in the usual sterile fashion. The right brachial vein was accessed via an IV in the antecubital fossa using a modified Seldinger technique, and a 6-Belgian sheath was placed in the vein. The right radial artery was accessed via the modified Seldinger technique, and a 6-Belgian sheath was placed in the right radial artery. A total of 120 mL of Visipaque contrast were used for the entire procedure. A Terumo band was used for hemostasis at the radial site. At the venous site, manual pressure was used for hemostasis. CATHETERS USED:    1. A 6-Belgian [place]-Sarah catheter. 2.  A 5-Belgian Moriarty 4.0 diagnostic catheter. FINDINGS:    1.  A left ventriculogram was not completed. 2.  Left ventricular diastolic pressure was not measured. 3.  Left main:  Normal.    4.  Left anterior descending:  Mild luminal irregularities with a focal 30% stenosis at the takeoff of the first diagonal.  5.  Left circumflex:  Mild luminal irregularities, no greater than 20%. 6.  Right coronary artery:  Mild luminal irregularities, no greater than 10%. 7.  Right heart cath was completed.   PA pressure was 45/21/31. RV was 48/7/16. RA was 22. Wedge was 29. Cardiac outputs measured at 5.1. CONCLUSIONS:  This is a 78-year-old male who is a poor surgical candidate, here for evaluation prior to TAVR. RECOMMENDATIONS:  Proceed with planned percutaneous valve replacement.       MD Lalo Leos / [place]  D: 04/09/2018 13:52     T: 04/09/2018 14:14  JOB #: [id number]

## 2018-04-09 NOTE — PROGRESS NOTES
Verbal and bedside report received from [address]. Right radial and brachial sites visualized, dressing c/d/i.

## 2018-04-09 NOTE — PROCEDURES
Brief Cardiac Procedure Note    Patient: [place]  MRN: [id number]  SSN: [id number]    YOB: 1945  Age: 67 y.o. Sex: male      Date of Procedure: 4/9/2018     Pre-procedure Diagnosis: Aortic Stenosis    Post-procedure Diagnosis: Aortic Stenosis    Procedure: Right and Left Heart Catheterization    Brief Description of Procedure: Cors and RHC    Performed By: Patrick Jones MD     Assistants: None    Anesthesia: Moderate Sedation    Estimated Blood Loss: Less than 10 mL      Specimens: None    Implants: None    Findings: Mild non-obstructive CAD. PA pressure was 61/58    Complications: None    Recommendations: Continue medical therapy.     Signed By: Patrick Jones MD     April 9, 2018

## 2018-04-10 NOTE — PROGRESS NOTES
Bedside and Verbal shift change report given to Nigel Quiñones RN (oncoming nurse) by Zhou Fernandes RN (offgoing nurse). Report included the following information SBAR, Kardex, Accordion and Recent Results.

## 2018-04-10 NOTE — PROGRESS NOTES
4/10/2018 8:21 AM    Admit Date: 4/5/2018    Admit Diagnosis: Anemia, unspecified type [D64.9]      Subjective:   No cp or sob      Objective:      Visit Vitals    BP 99/64 (BP 1 Location: Left arm, BP Patient Position: At rest)    Pulse 68    Temp 97.9 °F (36.6 °C)    Resp 18    Wt 79.6 kg (175 lb 6.4 oz)    SpO2 95%    BMI 25.9 kg/m2       Physical Exam:  General-Well Developed, Well Nourished, No Acute Distress, Alert & Oriented x 3, appropriate mood. Neck- supple, no JVD  CV- regular rate and rhythm with 3/6 wil  Lung- clear bilaterally  Abd- soft, nontender, nondistended  Ext- no edema bilaterally. Skin- warm and dry        Data Review:   Recent Labs      04/10/18   0355   NA  139   K  4.4   BUN  18   CREA  1.08   WBC  7.0   HGB  8.5*   HCT  28.1*   PLT  338       Assessment/Plan:     Principal Problem:    systolic CHF, acute on chronic (HCC) (4/5/2018) Stable. Continue current medical therapy.   Ef 4-0- no ace/arb due to hypotension  Will wean O2 and home sson  Active Problems:    Tobacco use disorder (7/6/2009)      Hypothyroidism (7/6/2009)      Nonrheumatic aortic valve stenosis (8/31/2016)- will need tavr after he demonstrated hgb stable      Dyspnea (4/5/2018)      Altered mental status (4/5/2018)      Symptomatic anemia (4/5/2018)- hgb better and stable- monitor daily

## 2018-04-10 NOTE — PROGRESS NOTES
TRANSFER - IN REPORT:    Verbal report received from JANNY Crane on [facility] being received from [facility] for routine progression of care      Report consisted of patients Situation, Background, Assessment and Recommendations(SBAR). Information from the following report(s) SBAR, ED Summary, Procedure Summary, MAR and Recent Results was reviewed with the receiving nurse. Opportunity for questions and clarification was provided. Assessment completed upon patients arrival to unit and care assumed.

## 2018-04-10 NOTE — CONSULTS
[address] [facility]  Nathan Dolan. MD Tom Stevenson. Daryl Holland MD             [facility]       [id number]  4/5/2018 1945            HISTORY OF PRESENT ILLNESS:  The patient is a 67 y.o. male who we are asked in Consultation regarding his Severe Aortic Stenosis. The patient was admitted 4/5/18 with AMS and chronic anemia that was severe from [address] [place] [address]. The patient is well known to [facility] and has been followed for years with AS however in [facility] he has missed several appointments and his last echocardiogram prior to this admission was in 2016 where the Aortic valve area by VTI 1.07 cm2, mean gradient 35.3 mmHg and peak 57.5 mmHg corresponding to moderate regurgitation. At that time the mitral valve showed mild regurgitation and trace tricuspid regurgitation with preserved EF 65-70%. On this admission echocardiogram revealed diminished EF 40-45% with severe Aortic stenosis with mean gradient 73 mmHg,peak pressure gradient 135 mmHg. Di= 0.166 with moderate regurgitation. Also LA and RA markedly dilated. Mitral valve with annular calcification and severe regurgitation. Tricuspid with moderate regurgitation. All images reviewed by Dr Krystal Vivar. Heart catheterization yesterday showed relatively clean coronaries. Since admission he did have EGD 4/6/18 with findings: moderate hiatal hernia; three fibrin based ulcers in the antrum and duodenitis. Likely source for his acute on chronic anemia. Transfused multiple units PRBC yesterday. He denies current chest pain. He admits to leg edema often that is currently absent. This patient has a long standing history of dementia, disorientation with multiple presentations to the [facility] ED. He also has medical non compliance with multiple no show appointments with Cardiology and Primary Care (Dr Murray Edwards).   It is noted within the [facility] notes that the patient has no spouse or children to help with caregiving. At times there is contact with a Nephew per notes. Risk Factors: Severe AS, Severe MV regurgitation, Tricuspid Valve moderate regurgitation, reduced EF 40-45%, acute on chronic anemia- multifactorial with diagnosis of gastric ulcers and duodenitis, chronic AMS with disorientation, current cigarette smoker. Past Medical History:   Diagnosis Date    Aortic valve regurgitation 11/23/2009    Chest Pain  7/6/2009    Dyslipidemia 7/6/2009    Endocrine disease     hypothyroid    Essential hypertension, benign 7/23/2009    Heart murmur 7/23/2009    Hyperlipidemia 8/8/2016    Hypothyroidism 7/6/2009    Tobacco use disorder 7/6/2009       History reviewed. No pertinent surgical history. History reviewed. No pertinent family history. Social History     Social History    Marital status: SINGLE     Spouse name: N/A    Number of children: N/A    Years of education: N/A     Occupational History    Not on file. Social History Main Topics    Smoking status: Current Every Day Smoker     Packs/day: 2.00    Smokeless tobacco: Never Used    Alcohol use No    Drug use: No    Sexual activity: No     Other Topics Concern    Not on file     Social History Narrative       Allergies   Allergen Reactions    Shrimp Nausea and Vomiting    Tetanus Vaccines And Toxoid Rash       No current facility-administered medications on file prior to encounter. Current Outpatient Prescriptions on File Prior to Encounter   Medication Sig Dispense Refill    furosemide (LASIX) 40 mg tablet Take  by mouth daily.  levothyroxine (SYNTHROID) 150 mcg tablet Take  by mouth Daily (before breakfast).  traZODone (DESYREL) 50 mg tablet Take 1 Tab by mouth nightly. (Patient taking differently: Take 50 mg by mouth nightly. As needed) 31 Tab 3    simvastatin (ZOCOR) 20 mg tablet Take  by mouth nightly.       metoprolol succinate (TOPROL-XL) 25 mg XL tablet Take  by mouth daily. REVIEW OF SYSTEMS:  GENERAL:  No weight loss. No fever. EYES:  No diplopia. No vision loss. ENTM:  No hearing loss. No trouble swallowing. No hoarseness. CARDIAC:  See present illness. PULMONARY:  Denies asthma or chronic pulmonary disease. + chronic smoker for 57 yrs   GI:  Gastric ulcers and duodenitis on admission  :  No dysuria. No history of renal stones or renal insufficiency. MS:  Denies osteoarthritis. NEURO:  No stroke/TIA or seizure disorder  HEME/LYMPHATIC:  No history of bleeding tendencies. PSYCHIATRIC: AMS and disorientation, chronic per [facility] care    PHYSICAL EXAMINATION:  GENERAL APPEARANCE:  Well developed. Well nourished. Pale. Alert, cooperative and oriented times three. HEENT:  Normocephalic. Extraocular muscles are intact. No scleral icterus. NECK/LYMPHATIC:  Supple with no carotid bruits. No jugular venous distention. LUNGS: Lungs are clear to auscultation. HEART:  Heart is regular rate and rhythm with a systolic loud murmur. ABDOMEN:  Soft and non-tender. SKIN:  No rashes, cyanosis, jaundice, ecchymoses or evidence of skin breakdown present. EXTREMITIES:  Full range of motion. No deformity. No peripheral edema. VASCULAR:  Pulses are full and equal at the radial arteries and the popliteal arteries bilaterally. There is no venous stasis disease. NEURO:  Grossly intact. IMPRESSION:  67year old male with Severe AS and MV stenosis and calcification with moderate Tricuspid valve regurgitation    PLAN:  Dr Sha Hoyt has seen Mr Juanito Dominique today in Consultation. Due to his on going Dementia, and issues of AMS/disorientation he is not a surgical candidate for double or triple valve disease. Thank you for asking us to participate in the care of Mr. Juanito Dominique.         Dr Sha Hoyt has personally viewed the cardiac catheterization and reviewed labs, chest x-ray and echocardiogram.

## 2018-04-10 NOTE — PROGRESS NOTES
Report from [facility], [address] [facility], care assumed.

## 2018-04-10 NOTE — PROGRESS NOTES
Bedside and Verbal shift change report given to Leonard Marks (oncoming nurse) by self (offgoing nurse). Report included the following information SBAR, Kardex, MAR and Recent Results.

## 2018-04-11 NOTE — PROGRESS NOTES
Bedside and Verbal shift change report given to [facility] (oncoming nurse) by Keyon Cameron RN (offgoing nurse). Report included the following information SBAR, Kardex, Accordion and Recent Results.

## 2018-04-11 NOTE — PROGRESS NOTES
Bedside and Verbal shift change report given to Nadiya Monte (oncoming nurse) by self (offgoing nurse). Report included the following information SBAR, Kardex, MAR and Recent Results.

## 2018-04-11 NOTE — PROGRESS NOTES
Problem: Falls - Risk of  Goal: *Absence of Falls  Document Vera Fall Risk and appropriate interventions in the flowsheet.    Outcome: Progressing Towards Goal  Fall Risk Interventions:  Mobility Interventions: Communicate number of staff needed for ambulation/transfer, Patient to call before getting OOB    Mentation Interventions: Door open when patient unattended    Medication Interventions: Patient to call before getting OOB, Teach patient to arise slowly    Elimination Interventions: Bed/chair exit alarm, Call light in reach, Patient to call for help with toileting needs

## 2018-04-11 NOTE — PROGRESS NOTES
Problem: Falls - Risk of  Goal: *Absence of Falls  Document Vera Fall Risk and appropriate interventions in the flowsheet.    Outcome: Progressing Towards Goal  Fall Risk Interventions:  Mobility Interventions: Bed/chair exit alarm, Patient to call before getting OOB    Mentation Interventions: Bed/chair exit alarm    Medication Interventions: Bed/chair exit alarm, Patient to call before getting OOB, Teach patient to arise slowly    Elimination Interventions: Bed/chair exit alarm, Call light in reach, Patient to call for help with toileting needs

## 2018-04-11 NOTE — PROGRESS NOTES
4/11/2018 3:01 PM    Admit Date: 4/5/2018    Admit Diagnosis: Anemia, unspecified type [D64.9]      Subjective:   No cp or sob      Objective:      Visit Vitals    /63    Pulse 76    Temp 98 °F (36.7 °C)    Resp 18    Wt 79.2 kg (174 lb 8 oz)    SpO2 98%    BMI 25.77 kg/m2       Physical Exam:  Marveen Settle, Well Nourished, No Acute Distress, Alert & Oriented x 3, appropriate mood. Neck- supple, no JVD  CV- regular rate and rhythm with 3/6 wil  Lung- clear bilaterally  Abd- soft, nontender, nondistended  Ext- no edema bilaterally.   Skin- warm and dry        Data Review:   Recent Labs      04/11/18   0347  04/10/18   0355   NA   --   139   K   --   4.4   BUN   --   18   CREA   --   1.08   WBC  8.4  7.0   HGB  8.4*  8.5*   HCT  27.5*  28.1*   PLT  316  338       Assessment/Plan:     Principal Problem:    Diastolic CHF, acute on chronic ([facility] [address] (4/5/2018)    Active Problems:    Tobacco use disorder (7/6/2009)      Hypothyroidism (7/6/2009)      Nonrheumatic aortic valve stenosis (8/31/2016)- continued tavr outpatient eval set up      Dyspnea (4/5/2018)/ Hypoxia- unable to wean O2- consult SW for home O2- home in am      Altered mental status (4/5/2018)      Symptomatic anemia (4/5/2018)/ PUD_ hgb stable

## 2018-04-11 NOTE — PROGRESS NOTES
Problem: Mobility Impaired (Adult and Pediatric)  Goal: *Acute Goals and Plan of Care (Insert Text)  LTG:  (1.)Mr. Hannah Cali will move from supine to sit and sit to supine , scoot up and down and roll side to side in bed with INDEPENDENCE within 7 treatment day(s). (2.)Mr. Hannah Cali will transfer from bed to chair and chair to bed with MODIFIED INDEPENDENCE using the least restrictive device within 7 treatment day(s). (3.)Mr. Hannah Cali will ambulate with MODIFIED INDEPENDENCE for 500 feet with the least restrictive device within 7 treatment day(s). (4.)Mr. Hannah Cali will participate in therapeutic activity/exerices x 23 minutes for increased activity tolerance within 7 days. ________________________________________________________________________________________________      PHYSICAL THERAPY: Daily Note, Treatment Day: 1st, AM 4/11/2018  INPATIENT: Hospital Day: 7  Payor: M[facility] Patient / Plan: [facility] / Product Type: [employer] /      NAME/AGE/GENDER: Sabi Vivar is a 67 y.o. male   PRIMARY DIAGNOSIS: Anemia, unspecified type [W09.4] Diastolic CHF, acute on chronic (HCC) Diastolic CHF, acute on chronic (HCC)  Procedure(s) (LRB):  ESOPHAGOGASTRODUODENOSCOPY (EGD) (N/A)  5 Days Post-Op  ICD-10: Treatment Diagnosis:    · Generalized Muscle Weakness (M62.81)   Precaution/Allergies:  Shrimp and Tetanus vaccines and toxoid      ASSESSMENT:     Mr. Hannah Cali is a 67 y.o. male in the hospital for the above, on 3.5 L O2 (reports no home O2 use) and agreeable to therapy. Discussed with , who reports patient lives with a friend named Barbara Edward who helps patient. SpO2 97% on room air with no SOB, so attempted ambulation on room air. Maintained between 91-95% during ambulation until return to room after ambulating 100' with rolling walker.  Noted patient with balance impairments, however good insight requesting walker use this AM. SpO2 89% post ambulation with c/o SOB and \"chest pressure,\" to which RN was alerted. Replaced 3.5 L O2 and improved to 98% within 3 minutes with improvement in SOB and chest pressure. He has made good progress, however does not tolerate activity well. Will continue therapy efforts. This section established at most recent assessment   PROBLEM LIST (Impairments causing functional limitations):  1. Decreased Strength  2. Decreased Transfer Abilities  3. Decreased Ambulation Ability/Technique  4. Decreased Balance   INTERVENTIONS PLANNED: (Benefits and precautions of physical therapy have been discussed with the patient.)  1. Balance Exercise  2. Bed Mobility  3. Family Education  4. Gait Training  5. Therapeutic Activites  6. Therapeutic Exercise/Strengthening  7. Transfer Training  8. Group Therapy     TREATMENT PLAN: Frequency/Duration: 3 times a week for duration of hospital stay  Rehabilitation Potential For Stated Goals: Good     RECOMMENDED REHABILITATION/EQUIPMENT: (at time of discharge pending progress): Due to the probability of continued deficits (see above) this patient will not likely need continued skilled physical therapy after discharge. Equipment:    None at this time              HISTORY:   History of Present Injury/Illness (Reason for Referral):  Per H&P: \"  HPI  Mario Álvarez is a 67 y. o. male seen for a follow up visit regarding   Aortic Stenosis (Dr. Mely Badillo pt last seen 03-13-17.  Pt could not stand to weigh.); Hypertension; and Shortness of Breath     He is worked in for shortness of breath.  He has recently seen Dr Tracy Wynne and started on diuretic antibiotics. [facility] is still having dyspnea and is moaning groaning loudly today. [facility] has not been resting at night and stays up in a chair. [facility] has 24/7 care.  Sidra Vladimir feels like he has sinus congestion and has pressure on his chest with worsening dyspnea.            Past Medical History, Past Surgical History, Family history, Social History, and Medications were all reviewed with the patient today and updated as necessary. \"  Past Medical History/Comorbidities:   Mr. Kiran Ramos  has a past medical history of Aortic valve regurgitation (11/23/2009); Chest Pain  (7/6/2009); Dyslipidemia (7/6/2009); Endocrine disease; Essential hypertension, benign (7/23/2009); Heart murmur (7/23/2009); Hyperlipidemia (8/8/2016); Hypothyroidism (7/6/2009); and Tobacco use disorder (7/6/2009). He also has no past medical history of Stroke [facility]). Mr. Kiran Ramos  has no past surgical history on file. Social History/Living Environment:   Home Environment: Private residence  # Steps to Enter: 3  One/Two Story Residence: One story  Living Alone: Yes  Support Systems: Friends \ neighbors  Patient Expects to be Discharged to[de-identified] Unknown  Current DME Used/Available at Home: None  Tub or Shower Type: Tub/Shower combination  Prior Level of Function/Work/Activity:  Pt reports that he lives alone in a one level house. Independent with ADLs and ambulation PTA. Number of Personal Factors/Comorbidities that affect the Plan of Care:  Lives alone 3+: HIGH COMPLEXITY   EXAMINATION:   Most Recent Physical Functioning:   Gross Assessment:                  Posture:     Balance:  Sitting: Intact  Standing: Impaired  Standing - Static: Good  Standing - Dynamic : Fair Bed Mobility:     Wheelchair Mobility:     Transfers:  Sit to Stand: Contact guard assistance  Stand to Sit: Contact guard assistance  Interventions: Safety awareness training;Manual cues; Verbal cues; Visual cues  Gait:     Base of Support: Center of gravity altered; Widened  Speed/Inez: Shuffled;Pace decreased (<100 feet/min); Slow  Step Length: Right shortened;Left shortened  Gait Abnormalities: Decreased step clearance; Path deviations;Trunk sway increased  Distance (ft): 100 Feet (ft)  Assistive Device: Walker, rolling;Gait belt  Ambulation - Level of Assistance: Contact guard assistance;Minimal assistance  Interventions: Manual cues; Safety awareness training;Verbal cues;Visual/Demos      Body Structures Involved:  1. Heart  2. Muscles Body Functions Affected:  1. Cardio  2. Neuromusculoskeletal  3. Movement Related Activities and Participation Affected:  1. Mobility  2. Domestic Life  3. Community, Social and [place]   Number of elements that affect the Plan of Care: 4+: HIGH COMPLEXITY   CLINICAL PRESENTATION:   Presentation: Stable and uncomplicated: LOW COMPLEXITY   CLINICAL DECISION MAKIN [employer] Mobility Inpatient Short Form  How much difficulty does the patient currently have. .. Unable A Lot A Little None   1. Turning over in bed (including adjusting bedclothes, sheets and blankets)? [] 1   [] 2   [] 3   [x] 4   2. Sitting down on and standing up from a chair with arms ( e.g., wheelchair, bedside commode, etc.)   [] 1   [] 2   [x] 3   [] 4   3. Moving from lying on back to sitting on the side of the bed? [] 1   [] 2   [] 3   [x] 4   How much help from another person does the patient currently need. .. Total A Lot A Little None   4. Moving to and from a bed to a chair (including a wheelchair)? [] 1   [] 2   [x] 3   [] 4   5. Need to walk in hospital room? [] 1   [] 2   [x] 3   [] 4   6. Climbing 3-5 steps with a railing? [] 1   [] 2   [x] 3   [] 4   © , Trustees of [address] [place] [address], under license to [employer]. All rights reserved      Score:  Initial: 20 Most Recent: X (Date: -- )    Interpretation of Tool:  Represents activities that are increasingly more difficult (i.e. Bed mobility, Transfers, Gait). Score 24 23 22-20 19-15 14-10 9-7 6     Modifier CH CI CJ CK CL CM CN      ?  Mobility - Walking and Moving Around:     - CURRENT STATUS: CJ - 20%-39% impaired, limited or restricted    - GOAL STATUS: CI - 1%-19% impaired, limited or restricted    - D/C STATUS:  ---------------To be determined---------------  Payor: Radha Barfield / Plan: Georgiana Mendez / Product Type: [employer] Medicare /      Medical Necessity:     · Patient demonstrates good rehab potential due to higher previous functional level. Reason for Services/Other Comments:  · Patient continues to require skilled intervention due to decreaed functional mobility and balance. Use of outcome tool(s) and clinical judgement create a POC that gives a: Clear prediction of patient's progress: LOW COMPLEXITY            TREATMENT:   (In addition to Assessment/Re-Assessment sessions the following treatments were rendered)   Pre-treatment Symptoms/Complaints:  \"I think that's a good idea. \"  Pain: Initial:   Pain Intensity 1: 0  Post Session:  0 \"No pain but chest pressure\"     Therapeutic Activity: (    23 minutes): Therapeutic activities including Bed transfers, Ambulation on level ground and standing balance to improve mobility, strength, balance and activity tolerance. Required minimal Manual cues; Safety awareness training;Verbal cues; Visual/Demos to promote static and dynamic balance in standing and to perform pursed lip breathing. Braces/Orthotics/Lines/Etc:   · IV  · O2 Device: Nasal cannula  Treatment/Session Assessment:    · Response to Treatment:  Tolerated fairly with SOB and \"Chest pressure,\" to which RN was notified. · Interdisciplinary Collaboration:   o Physical Therapist  o Registered Nurse  · After treatment position/precautions:   o Bed alarm/tab alert on  o Bed/Chair-wheels locked  o Bed in low position  o Call light within reach  o Nurse at bedside  o sitting edge of bed   · Compliance with Program/Exercises: Will assess as treatment progresses. · Recommendations/Intent for next treatment session: \"Next visit will focus on advancements to more challenging activities and reduction in assistance provided\".   Total Treatment Duration:  PT Patient Time In/Time Out  Time In: 0941  Time Out: [address], DPT

## 2018-04-11 NOTE — PROGRESS NOTES
Bedside and Verbal shift change report given to self (oncoming nurse) by Sierra Jacobson (offgoing nurse). Report included the following information SBAR, Kardex, MAR and Recent Results.

## 2018-04-11 NOTE — PROGRESS NOTES
Educational information/pamphlet provided to the pt this morning and called and spoke to pts Len ZAFAR regarding aortic stenosis and treatment options (SAVR and TAVR). Also discussed plans and dates for upcoming tests in detail for April 24th (PFT, carotid US, and TAVR CT) to evaluate pt for potential TAVR. Contact information provided and questions answered.     Jose Armando Chamberlain, TAVR Coordinator

## 2018-04-11 NOTE — PROGRESS NOTES
Patient will need O2 however need qualifying O2 sats of <88%. Notified nursing of need for qualifying O2 sats. [facility] [employer] and can deliver O2 once received O2 sats. Sent information to them and will update with O2 sats when available. CM following. Addendum Notified Camila Garcia patient POA/HCPOA of d/c plan for 4/12 and possible new O2. He is in agreement with d/c plan of home with home health.

## 2018-04-12 NOTE — DISCHARGE INSTRUCTIONS
Learning About Transcatheter Aortic Valve Replacement (TAVR)  What is TAVR? Transcatheter aortic valve replacement (TAVR) is a procedure that replaces the aortic heart valve. It is done to treat aortic valve stenosis. In aortic valve stenosis, the valve between your heart and the large blood vessel that carries blood to the body (aorta) has narrowed. That forces the heart to pump harder to get enough blood through the valve. TAVR can help some people feel better and live longer. In TAVR, the doctor uses a catheter to put in the new heart valve. Open-heart surgery is not done. TAVR is a newer procedure. How well it works long-term is not known yet. And TAVR can cause serious problems. These include stroke, a heart attack during the procedure, or even death. TAVR may be a good option for a person who cannot have surgery or for a person who has a high risk of serious problems from open-heart surgery. For example, you might think about TAVR if you are not healthy enough for an open-heart surgery. TAVR may not be a good choice if an open-heart surgery is likely to be successful. A team of doctors will use professional guidelines to decide whether or not TAVR is a good choice for you. Your personal feelings are also important. Talk to your doctors about your goals for treatment. How is TAVR done? TAVR is often done through an incision (cut) in the groin. But sometimes a small cut is made in the chest. The doctor uses a tube called a catheter and special tools that fit inside the catheter. The doctor puts the catheter into a blood vessel and moves it through the blood vessel and into the heart. A specially designed artificial valve fits inside the catheter. The doctor then moves the new valve into the damaged aortic valve. The artificial valve expands and takes the place of the damaged aortic valve. You may be asleep for the procedure, or you may get a sedative that will help you relax.  The surgery usually takes about 2 to 3 hours. You will have to stay in the hospital for several days after the procedure. What can you expect after TAVR? · While you are in the hospital, your doctors and nurses will monitor you to check how the new valve is working. · You will receive information from the hospital about diet, activities, and medicine. · You will need to have regular checkups with your doctor. · When you leave the hospital, your doctor may give you a blood thinner for a few months to prevent blood clots. If you get a blood thinner, be sure you get instructions about how to take your medicine safely. Blood thinners can cause serious bleeding problems. Follow-up care is a key part of your treatment and safety. Be sure to make and go to all appointments, and call your doctor if you are having problems. It's also a good idea to know your test results and keep a list of the medicines you take. Where can you learn more? Go to http://agueda-matthew.info/. Enter B123 in the search box to learn more about \"Learning About Transcatheter Aortic Valve Replacement (TAVR). \"  Current as of: September 21, 2016  Content Version: 11.4  © 7488-3365 [employer]. Care instructions adapted under license by [employer] (which disclaims liability or warranty for this information). If you have questions about a medical condition or this instruction, always ask your healthcare professional. [address] any warranty or liability for your use of this information. Cardiac Catheterization/Angiography Discharge Instructions    *Check the puncture site frequently for swelling or bleeding. If you see any bleeding, lie down and apply pressure over the area with a clean town or washcloth. Notify your doctor for any redness, swelling, drainage or oozing from the puncture site. Notify your doctor for any fever or chills.     *If the leg or arm with the puncture becomes cold, numb or painful, call Dr Argentina Brizuela    *Activity should be limited for the next 48 hours. Climb stairs as little as possible and avoid any stooping, bending or strenuous activity for 48 hours. No heavy lifting (anything over 10 pounds) for three days. *Do not drive for 48 hours. *You may resume your usual diet. Drink more fluids than usual.    *Have a responsible person drive you home and stay with you for at least 24 hours after your heart catheterization/angiography. *You may remove the bandage from your cath site in 24 hours. You may shower in 24 hours. No tub baths, hot tubs or swimming for one week. Do not place any lotions, creams, powders, ointments over the puncture site for one week. You may place a clean band-aid over the puncture site each day for 5 days. Change this daily. Peptic Ulcer Disease: Care Instructions  Your Care Instructions    Peptic ulcers are sores on the inside of the stomach or the small intestine. They are usually caused by an infection with bacteria or from use of nonsteroidal anti-inflammatory drugs (NSAIDs). NSAIDs include aspirin, ibuprofen (Advil), and naproxen (Aleve). Your doctor may have prescribed medicine to reduce stomach acid. You also may need to take antibiotics if your peptic ulcers are caused by an infection. You can help your stomach heal and keep ulcers from coming back by making some changes in your lifestyle. Quit smoking, limit caffeine and alcohol, and reduce stress. Follow-up care is a key part of your treatment and safety. Be sure to make and go to all appointments, and call your doctor if you are having problems. It's also a good idea to know your test results and keep a list of the medicines you take. How can you care for yourself at home? · Take your medicines exactly as prescribed. Call your doctor if you think you are having a problem with your medicine.   · Do not take aspirin or other NSAIDs such as ibuprofen (Advil or Motrin) or naproxen (Aleve). Ask your doctor what you can take for pain. · Do not smoke. Smoking can make ulcers worse. If you need help quitting, talk to your doctor about stop-smoking programs and medicines. These can increase your chances of quitting for good. · Drink in moderation or avoid drinking alcohol. · Do not drink beverages that have caffeine if they bother your stomach. These include coffee, tea, and soda. · Eat a balanced diet of small, frequent meals. Make an appointment with a dietitian if you need help planning your meals. · Reduce stress. Avoid people and places that make you feel anxious, if you can. Learn ways to reduce stress, such as biofeedback, guided imagery, and meditation. When should you call for help? Call 911 anytime you think you may need emergency care. For example, call if:  ? · You vomit blood or what looks like coffee grounds. ? · You pass maroon or very bloody stools. ?Call your doctor now or seek immediate medical care if:  ? · You have new or worse belly pain. ? · Your stools are black and look like tar, or they have streaks of blood. ? · You are vomiting. ? Watch closely for changes in your health, and be sure to contact your doctor if:  ? · You do not feel better as expected. Where can you learn more? Go to http://agueda-matthew.info/. Enter A284 in the search box to learn more about \"Peptic Ulcer Disease: Care Instructions. \"  Current as of: May 12, 2017  Content Version: 11.4  © 3381-3304 [employer]. Care instructions adapted under license by [employer] (which disclaims liability or warranty for this information). If you have questions about a medical condition or this instruction, always ask your healthcare professional. [address] any warranty or liability for your use of this information.       Heart Failure: Care Instructions  Your Care Instructions    Heart failure occurs when your heart does not pump as much blood as the body needs. Failure does not mean that the heart has stopped pumping but rather that it is not pumping as well as it should. Over time, this causes fluid buildup in your lungs and other parts of your body. Fluid buildup can cause shortness of breath, fatigue, swollen ankles, and other problems. By taking medicines regularly, reducing sodium (salt) in your diet, checking your weight every day, and making lifestyle changes, you can feel better and live longer. Follow-up care is a key part of your treatment and safety. Be sure to make and go to all appointments, and call your doctor if you are having problems. It's also a good idea to know your test results and keep a list of the medicines you take. How can you care for yourself at home? Medicines  ? · Be safe with medicines. Take your medicines exactly as prescribed. Call your doctor if you think you are having a problem with your medicine. ? · Do not take any vitamins, over-the-counter medicine, or herbal products without talking to your doctor first. Robert Calderont not take ibuprofen (Advil or Motrin) and naproxen (Aleve) without talking to your doctor first. They could make your heart failure worse. ? · You may be taking some of the following medicine. ¨ Beta-blockers can slow heart rate, decrease blood pressure, and improve your condition. Taking a beta-blocker may lower your chance of needing to be hospitalized. ¨ Angiotensin-converting enzyme inhibitors (ACEIs) reduce the heart's workload, lower blood pressure, and reduce swelling. Taking an ACEI may lower your chance of needing to be hospitalized again. ¨ Angiotensin II receptor blockers (ARBs) work like ACEIs. Your doctor may prescribe them instead of ACEIs. ¨ Diuretics, also called water pills, reduce swelling. ¨ Potassium supplements replace this important mineral, which is sometimes lost with diuretics.   ¨ Aspirin and other blood thinners prevent blood clots, which can cause a stroke or heart attack. ? You will get more details on the specific medicines your doctor prescribes. Diet  ? · Your doctor may suggest that you limit sodium to 2,000 milligrams (mg) a day or less. That is less than 1 teaspoon of salt a day, including all the salt you eat in cooking or in packaged foods. People get most of their sodium from processed foods. Fast food and restaurant meals also tend to be very high in sodium. ? · Ask your doctor how much liquid you can drink each day. You may have to limit liquids. ?Weight  ? · Weigh yourself without clothing at the same time each day. Record your weight. Call your doctor if you have a sudden weight gain, such as more than 2 to 3 pounds in a day or 5 pounds in a week. (Your doctor may suggest a different range of weight gain.) A sudden weight gain may mean that your heart failure is getting worse. ? Activity level  ? · Start light exercise (if your doctor says it is okay). Even if you can only do a small amount, exercise will help you get stronger, have more energy, and manage your weight and your stress. Walking is an easy way to get exercise. Start out by walking a little more than you did before. Bit by bit, increase the amount you walk. ? · When you exercise, watch for signs that your heart is working too hard. You are pushing yourself too hard if you cannot talk while you are exercising. If you become short of breath or dizzy or have chest pain, stop, sit down, and rest.   ? · If you feel \"wiped out\" the day after you exercise, walk slower or for a shorter distance until you can work up to a better pace. ? · Get enough rest at night. Sleeping with 1 or 2 pillows under your upper body and head may help you breathe easier. ? Lifestyle changes  ? · Do not smoke. Smoking can make a heart condition worse. If you need help quitting, talk to your doctor about stop-smoking programs and medicines. These can increase your chances of quitting for good.  Quitting smoking may be the most important step you can take to protect your heart. ? · Limit alcohol to 2 drinks a day for men and 1 drink a day for women. Too much alcohol can cause health problems. ? · Avoid getting sick from colds and the flu. Get a pneumococcal vaccine shot. If you have had one before, ask your doctor whether you need another dose. Get a flu shot each year. If you must be around people with colds or the flu, wash your hands often. When should you call for help? Call 911 if you have symptoms of sudden heart failure such as:  ? · You have severe trouble breathing. ? · You cough up pink, foamy mucus. ? · You have a new irregular or rapid heartbeat. ?Call your doctor now or seek immediate medical care if:  ? · You have new or increased shortness of breath. ? · You are dizzy or lightheaded, or you feel like you may faint. ? · You have sudden weight gain, such as more than 2 to 3 pounds in a day or 5 pounds in a week. (Your doctor may suggest a different range of weight gain.)   ? · You have increased swelling in your legs, ankles, or feet. ? · You are suddenly so tired or weak that you cannot do your usual activities. ? Watch closely for changes in your health, and be sure to contact your doctor if you develop new symptoms. Where can you learn more? Go to http://agueda-matthew.info/. Enter M142 in the search box to learn more about \"Heart Failure: Care Instructions. \"  Current as of: September 21, 2016  Content Version: 11.4  © 2657-7953 [employer]. Care instructions adapted under license by [employer] (which disclaims liability or warranty for this information). If you have questions about a medical condition or this instruction, always ask your healthcare professional. [place] [address] any warranty or liability for your use of this information.     DISCHARGE SUMMARY from Nurse    PATIENT INSTRUCTIONS:    After general anesthesia or intravenous sedation, for 24 hours or while taking prescription Narcotics:  · Limit your activities  · Do not drive and operate hazardous machinery  · Do not make important personal or business decisions  · Do  not drink alcoholic beverages  · If you have not urinated within 8 hours after discharge, please contact your surgeon on call. Report the following to your surgeon:  · Excessive pain, swelling, redness or odor of or around the surgical area  · Temperature over 100.5  · Nausea and vomiting lasting longer than 4 hours or if unable to take medications  · Any signs of decreased circulation or nerve impairment to extremity: change in color, persistent  numbness, tingling, coldness or increase pain  · Any questions    What to do at Home:The patient is being discharged home in stable condition on a low saturated fat, low cholesterol and low salt diet. The patient is instructed to advance activities as tolerated to the limit of fatigue or shortness of breath. The patient is instructed to avoid all heavy lifting for 5 days. The patient is instructed to watch the cath site for bleeding/oozing; if seen, the patient is instructed to apply firm pressure with a clean cloth and call [facility] at [phone]. The patient is instructed to watch for signs of infection which include: increasing area of redness, fever/hot to touch or purulent drainage at the catheterization site. The patient is instructed not to soak in a bathtub for 7-10 days, but is cleared to shower. The patient is instructed to call the office or return to the ER for immediate evaluation for any shortness of breath or chest pain not relieved by NTG.      *  Please give a list of your current medications to your Primary Care Provider. *  Please update this list whenever your medications are discontinued, doses are      changed, or new medications (including over-the-counter products) are added.     *  Please carry medication information at all times in case of emergency situations. These are general instructions for a healthy lifestyle:    No smoking/ No tobacco products/ Avoid exposure to second hand smoke  Surgeon General's Warning:  Quitting smoking now greatly reduces serious risk to your health. Obesity, smoking, and sedentary lifestyle greatly increases your risk for illness    A healthy diet, regular physical exercise & weight monitoring are important for maintaining a healthy lifestyle    You may be retaining fluid if you have a history of heart failure or if you experience any of the following symptoms:  Weight gain of 3 pounds or more overnight or 5 pounds in a week, increased swelling in our hands or feet or shortness of breath while lying flat in bed. Please call your doctor as soon as you notice any of these symptoms; do not wait until your next office visit. Recognize signs and symptoms of STROKE:    F-face looks uneven    A-arms unable to move or move unevenly    S-speech slurred or non-existent    T-time-call 911 as soon as signs and symptoms begin-DO NOT go       Back to bed or wait to see if you get better-TIME IS BRAIN. Warning Signs of HEART ATTACK     Call 911 if you have these symptoms:   Chest discomfort. Most heart attacks involve discomfort in the center of the chest that lasts more than a few minutes, or that goes away and comes back. It can feel like uncomfortable pressure, squeezing, fullness, or pain.  Discomfort in other areas of the upper body. Symptoms can include pain or discomfort in one or both arms, the back, neck, jaw, or stomach.  Shortness of breath with or without chest discomfort.  Other signs may include breaking out in a cold sweat, nausea, or lightheadedness. Don't wait more than five minutes to call 911 - MINUTES MATTER! Fast action can save your life. Calling 911 is almost always the fastest way to get lifesaving treatment.  Emergency Medical Services staff can begin treatment when they arrive -- up to an hour sooner than if someone gets to the hospital by car. The discharge information has been reviewed with the patient. The patient verbalized understanding. Discharge medications reviewed with the patient and appropriate educational materials and side effects teaching were provided.   ___________________________________________________________________________________________________________________________________

## 2018-04-12 NOTE — ROUTINE PROCESS
Leonidas Kyle Friend [phone]     CHF introduction to Care giver on phone, will complete with him @ BS, will follow.     Bring Medicare card

## 2018-04-12 NOTE — DISCHARGE SUMMARY
[address] [place] [address] 121 Cardiology Discharge Summary     Patient ID:  Nancy Acosta  [id number]  67 y.o.  1945    Admit date: 4/5/2018    Discharge date:  4/12/18    Admitting Physician: Matilde Acosta MD     Discharge Physician: Hernan Pearson NP/Dr. Mcmillan    Admission Diagnoses: Anemia, unspecified type [D64.9]    Discharge Diagnoses:   Patient Active Problem List    Diagnosis Date Noted    CHF (congestive heart failure) ([facility] 04/05/2018    Dyspnea 31/07/1076    Diastolic CHF, acute on chronic ([facility] 04/05/2018    Altered mental status 04/05/2018    Symptomatic anemia 04/05/2018    Nonrheumatic aortic valve stenosis 08/31/2016    Hyperlipidemia 08/08/2016    Endocrine disease     Aortic valve regurgitation 11/23/2009    Essential hypertension, benign 07/23/2009    Heart murmur 07/23/2009    Tobacco use disorder 07/06/2009    Chest Pain  07/06/2009    Dyslipidemia 07/06/2009    Hypothyroidism 07/06/2009    Chest pain 07/06/2009       Cardiology Procedures this admission:  Left heart catheterization with PCI  EchoCardiogram  Consults: GI, Hospitalist and Cardiac Surgery    Hospital Course: Patient was seen at the office of [address] [place] [address] 121 Cardiology by Dr. Ariel Proctor for complaints of shortness of breath and chest pain. The patient was a direct admit for likely heart failure needing invasive workup. The patient was also having confusion so hospitalist was consulted. Hospitalist ordered CT head that showed no acute intracranial process and UA was negative. GI saw patient for anemia. Patient required transfusion during his hospital stay. EGD showed gastric ulcers likely secondary to goody powder. PPI was added. An echocardiogram showed -  Left ventricle: Systolic function was mildly reduced. Ejection fraction was estimated in the range of 40 % to 45 %. This study was inadequate for the evaluation of regional wall motion. Avg. E/e'= 22.56 (grade 2 diastolic dysfunction). -  Right ventricle:  The ventricle was mildly dilated. -  Left atrium: The atrium was markedly dilated. -  Right atrium: The atrium was markedly dilated. -  Inferior vena cava, hepatic veins: The inferior vena cava was dilated. Respirophasic changes in dimension were absent. -  Aortic valve: There was moderate regurgitation. The findings were most consistent with severe aortic stenosis. -  Mitral valve: There was severe regurgitation. The mitral valve area by  pressure half time was 3.28 cm2.-  Tricuspid valve: There was moderate regurgitation. -  Pericardium: A small pericardial effusion was identified. There was no evidence of hemodynamic compromise. There was a left pleural effusion. The patient underwent LHC/RHC by Dr. Usha Phelps for evaluation prior to likely TAVR. The patient's LHC showed Mild nonobstructive disease with recommendations to proceed with planned percutaneous valve replacement. The RHC showed  PA pressure was 45/21/31. RV was 48/7/16. RA was 22. Wedge was 29. Cardiac outputs measured at 5.1. CTS saw patient with recommendations with ongoing dementia and issues of AMS/disorientation he is not a surgical candidate for double or triple valve disease. The patient will need outpatient evaluation for TAVR. The patient's anemia remained stable and on 4/12/18 was stable to be discharged home. Patient's right radial cath site was clean, dry and intact without hematoma or bruit. Patient's labs were reviewed with Hgb at 8.4 . Patient was seen and examined by Dr. Nakul Davalos and determined stable and ready for discharge. Patient was instructed on the importance of medication compliance and follow up appointment. The patient will continue low dose BB for his mild CAD and AS. The patient will follow up with [facility] Dr. Kayleen Snyder on 4/17/18 at 11:15 am in [place] office. Dr. Kayleen Snyder can reassess need for outpatient TAVR workup once GI issues resolve. The patient will need CBC prior to appt with Dr. Kayleen Snyder ([facility] will draw). The patient will be assess for O2 needs prior to discharge. The patient will need home health at d/c.     DISPOSITION: The patient is being discharged home in stable condition on a low saturated fat, low cholesterol and low salt diet. The patient is instructed to advance activities as tolerated to the limit of fatigue or shortness of breath. The patient is instructed to avoid all heavy lifting for 5 days. The patient is instructed to watch the cath site for bleeding/oozing; if seen, the patient is instructed to apply firm pressure with a clean cloth and call [facility] at [phone]. The patient is instructed to watch for signs of infection which include: increasing area of redness, fever/hot to touch or purulent drainage at the catheterization site. The patient is instructed not to soak in a bathtub for 7-10 days, but is cleared to shower. The patient is instructed to call the office or return to the ER for immediate evaluation for any shortness of breath or chest pain not relieved by NTG. Discharge Exam:   Visit Vitals    BP 94/57 (BP 1 Location: Right arm, BP Patient Position: At rest)    Pulse 64    Temp 98.5 °F (36.9 °C)    Resp 19    Wt 80.6 kg (177 lb 9.6 oz)    SpO2 93%    BMI 26.23 kg/m2     Patient has been seen by Dr. Dorothy Lucas: see his progress note for exam details.     Recent Results (from the past 24 hour(s))   CBC WITH AUTOMATED DIFF    Collection Time: 04/12/18  3:34 AM   Result Value Ref Range    WBC 7.5 4.3 - 11.1 K/uL    RBC 3.15 (L) 4.23 - 5.67 M/uL    HGB 8.4 (L) 13.6 - 17.2 g/dL    HCT 27.3 (L) 41.1 - 50.3 %    MCV 86.7 79.6 - 97.8 FL    MCH 26.7 26.1 - 32.9 PG    MCHC 30.8 (L) 31.4 - 35.0 g/dL    RDW 20.9 (H) 11.9 - 14.6 %    PLATELET 050 875 - 608 K/uL    MPV 10.7 (L) 10.8 - 14.1 FL    DF AUTOMATED      NEUTROPHILS 72 43 - 78 %    LYMPHOCYTES 15 13 - 44 %    MONOCYTES 7 4.0 - 12.0 %    EOSINOPHILS 6 0.5 - 7.8 %    BASOPHILS 0 0.0 - 2.0 %    IMMATURE GRANULOCYTES 0 0.0 - 5.0 %    ABS. NEUTROPHILS 5.5 1.7 - 8.2 K/UL    ABS. LYMPHOCYTES 1.1 0.5 - 4.6 K/UL    ABS. MONOCYTES 0.5 0.1 - 1.3 K/UL    ABS. EOSINOPHILS 0.4 0.0 - 0.8 K/UL    ABS. BASOPHILS 0.0 0.0 - 0.2 K/UL    ABS. IMM. GRANS. 0.0 0.0 - 0.5 K/UL         Patient Instructions:   Current Discharge Medication List      START taking these medications    Details   ferrous sulfate 325 mg (65 mg iron) tablet Take 1 Tab by mouth two (2) times daily (with meals). Qty: 60 Tab, Refills: 11      pantoprazole (PROTONIX) 40 mg tablet Take 1 Tab by mouth Before breakfast and dinner. Qty: 30 Tab, Refills: 11         CONTINUE these medications which have CHANGED    Details   metoprolol succinate (TOPROL-XL) 25 mg XL tablet Take 1 Tab by mouth daily. Qty: 30 Tab, Refills: 11         CONTINUE these medications which have NOT CHANGED    Details   simvastatin (ZOCOR) 40 mg tablet Take 40 mg by mouth nightly. furosemide (LASIX) 40 mg tablet Take  by mouth daily. levothyroxine (SYNTHROID) 150 mcg tablet Take  by mouth Daily (before breakfast). traZODone (DESYREL) 50 mg tablet Take 1 Tab by mouth nightly.   Qty: 31 Tab, Refills: 3         STOP taking these medications       metoprolol tartrate (LOPRESSOR) 25 mg tablet Comments:   Reason for Stopping:                 Signed:  ROHIT Dunbar  4/12/2018  7:26 AM

## 2018-04-12 NOTE — PROGRESS NOTES
[facility] - INPATIENT  Face to Face Encounter    Patients Name: Vernell Amador    YOB: 1945    Ordering Physician: Roxana Arnold MD     Primary Diagnosis: Anemia, unspecified type [D64.9] CHF    Date of Face to Face:   4/12/2018                                  Face to Face Encounter findings are related to primary reason for home care:   yes. 1. I certify that the patient needs intermittent care as follows: skilled nursing care:  skilled observation/assessment, patient education  physical therapy: strengthening  occupational therapy:  pt/caregiver education    2. I certify that this patient is homebound, that is: Patient's condition makes leaving the home medically contraindicated; Patient has a normal inability to leave the home and leaving the home requires considerable and taxing effort. Patient may leave the home for infrequent and short duration for medical reasons, and occasional absences for non-medical reasons. Homebound status is due to the following functional limitations:     3. I certify that this patient is under my care and that I, or a nurse practitioner or  [id number], or clinical nurse specialist, or certified nurse midwife, working with me, had a Face-to-Face Encounter that meets the physician Face-to-Face Encounter requirements. The following are the clinical findings from the [address] [place] [address] encounter that support the need for skilled services and is a summary of the encounter:    See Progress notes and D/C summary     Otto León RN  4/12/2018      THE FOLLOWING TO BE COMPLETED BY THE COMMUNITY PHYSICIAN:    I concur with the findings described above from the [facility] encounter that this patient is homebound and in need of a skilled service.     Certifying Physician: _____________________________________      Printed Certifying Physician Name: _____________________________________    Date: _________________

## 2018-04-12 NOTE — PROGRESS NOTES
Bedside and Verbal shift change report given to [facility] (oncoming nurse) by Erin Orozco RN (offgoing nurse). Report included the following information SBAR, Kardex, Accordion and Recent Results.

## 2018-04-12 NOTE — PROGRESS NOTES
Bedside and Verbal shift change report given to self (oncoming nurse) by Ryan Bean (offgoing nurse). Report included the following information SBAR, Kardex, MAR and Recent Results.

## 2018-04-12 NOTE — ROUTINE PROCESS
CHF teaching started post introduction to pt/caregiver; aware of diagnosis. Planner/scale @ BS and will follow. Smoking/ ETOH/Illicit drug use cessation and maintain a healthy weight covered. Pt/family aware that I can not prescribe nor adjust  medications: 15mins  Palliative Care score:  Start 2L/D Fluid restriction  CHF teaching continues to pt/family. Emphasis on taking prescription meds as ordered, to keep F/U appts and to call MD STAT . CHF teaching completed to pt/ caregiver @ BS, verbalize emphasis on monitoring self and report to MD:   If you gain 2 lbs in one day or 5 lbs in a week, and short of breath.  If you can not lay flat without developing short of breath or rapid breathing at night; or if it wakes you up. Develop a cough or wheezing.  If you notice swollen hands/feet/ankles or stomach with a bloated/ full feeling.  If you be  ome confused or mentally fuzzy or dizzy.  If you notice a rapid or change in your heart rate.  If you become more exhausted all the time and unable to do the same level of activity without stopping to catch your breath. Drink no more than 8 cups a day in 8 oz. cups. Limit Cola Drinks. Your Heart can not handle any more. Stay away from salt (limit anything with salt or sodium in it). Limit to 250mg per serving. Exercise needs to be started with your Doctors approval.  Reduce stress; Call myself or Provider if assistance is needed. Pass post test via teach back, will make self available post DC ,if an questions arise. Diabetic teaching completed.  Planner/scale @ BS:  60 mins total    NO fast foods, buffets

## 2018-04-12 NOTE — PROGRESS NOTES
O2 saturation 87 on RA. Placed on 3 liters and oxygen saturation 95. O2 saturation ambulating 84 on RA.

## 2018-04-12 NOTE — PROGRESS NOTES
4/12/2018 7:06 AM    Admit Date: 4/5/2018    Admit Diagnosis: Anemia, unspecified type [D64.9]      Subjective:    Patient with AS getting TAVR workup    Objective:      Visit Vitals    BP 94/57 (BP 1 Location: Right arm, BP Patient Position: At rest)    Pulse 64    Temp 98.5 °F (36.9 °C)    Resp 19    Wt 80.6 kg (177 lb 9.6 oz)    SpO2 93%    BMI 26.23 kg/m2       ROS:  General ROS: negative for - chills  Hematological and Lymphatic ROS: negative for - blood clots or jaundice  Respiratory ROS: no cough, shortness of breath, or wheezing  Cardiovascular ROS: no chest pain or dyspnea on exertion  Gastrointestinal ROS: no abdominal pain, change in bowel habits, or black or bloody stools  Neurological ROS: no TIA or stroke symptoms    Physical Exam:    Physical Examination: General appearance - alert, well appearing, and in no distress  Mental status - alert, oriented to person, place, and time  Eyes - pupils equal and reactive, extraocular eye movements intact  Neck/lymph - supple, no significant adenopathy  Chest/CV - clear to auscultation, no wheezes, rales or rhonchi, symmetric air entry  Heart - normal rate, regular rhythm, normal S1, S2, no murmurs, rubs, clicks or gallops  Abdomen/GI - soft, nontender, nondistended, no masses or organomegaly  Musculoskeletal - no joint tenderness, deformity or swelling  Extremities - peripheral pulses normal, no pedal edema, no clubbing or cyanosis  Skin - normal coloration and turgor, no rashes, no suspicious skin lesions noted    Current Facility-Administered Medications   Medication Dose Route Frequency    sodium chloride (NS) flush 5-10 mL  5-10 mL IntraVENous Q8H    sodium chloride (NS) flush 5-10 mL  5-10 mL IntraVENous PRN    acetaminophen (TYLENOL) tablet 650 mg  650 mg Oral Q4H PRN    HYDROcodone-acetaminophen (NORCO) 5-325 mg per tablet 1 Tab  1 Tab Oral Q4H PRN    morphine injection 2 mg  2 mg IntraVENous Q4H PRN    naloxone (NARCAN) injection 0.4 mg 0.4 mg IntraVENous PRN    ondansetron (ZOFRAN) injection 4 mg  4 mg IntraVENous ONCE PRN    0.9% sodium chloride infusion 250 mL  250 mL IntraVENous PRN    0.9% sodium chloride infusion 250 mL  250 mL IntraVENous PRN    ferrous sulfate tablet 325 mg  1 Tab Oral BID WITH MEALS    pantoprazole (PROTONIX) tablet 40 mg  40 mg Oral ACB&D    levothyroxine (SYNTHROID) tablet 125 mcg  125 mcg Oral ACB    metoprolol succinate (TOPROL-XL) XL tablet 50 mg  50 mg Oral DAILY    simvastatin (ZOCOR) tablet 20 mg  20 mg Oral QHS    traZODone (DESYREL) tablet 50 mg  50 mg Oral QHS    sodium chloride (NS) flush 5-10 mL  5-10 mL IntraVENous PRN    nitroglycerin (NITROSTAT) tablet 0.4 mg  0.4 mg SubLINGual Q5MIN PRN    HYDROcodone-acetaminophen (NORCO) 7.5-325 mg per tablet 1 Tab  1 Tab Oral Q4H PRN    ondansetron (ZOFRAN ODT) tablet 4 mg  4 mg Oral Q4H PRN    0.9% sodium chloride infusion 250 mL  250 mL IntraVENous PRN    nicotine (NICODERM CQ) 14 mg/24 hr patch 1 Patch  1 Patch TransDERmal DAILY       Data Review:   @LABRCNT(Na,K,BUN,CREA,WBC,HGB,HCT,PLT,INR,TRP,TCHOL*,Triglyceride*,LDL*,LDLCPOC HDL*,HDL])@    TELEMETRY: nsr    Assessment/Plan:     Principal Problem:    Diastolic CHF, acute on chronic (HCC) (4/5/2018)The current medical regimen is effective;  continue present plan and medications.       Active Problems:    Tobacco use disorder (7/6/2009)      Hypothyroidism (7/6/2009)      Nonrheumatic aortic valve stenosis (8/31/2016) consider TAVR      Dyspnea (4/5/2018)      Altered mental status (4/5/2018)      Symptomatic anemia (4/5/2018)    Home today      Rayna Ndiaye MD

## 2018-04-12 NOTE — PROGRESS NOTES
O2 saturation resting on RA was 87%. Placed on 3  liters and oxygen saturation 95%. O2 saturation ambulating was 84% on RA. Placed on 3 L liters and O2 saturation 95%.

## 2018-04-12 NOTE — PROGRESS NOTES
IV removed. Site clean dry and intact. Right radial and brachial sites are clean dry and intact with no bleeding or hematoma. Tele monitor removed and returned to monitor room. Patient ready for discharge.

## 2018-04-12 NOTE — PROGRESS NOTES
Care Management Interventions  PCP Verified by CM: Yes  Palliative Care Criteria Met (RRAT>21 & CHF Dx)?: No (RRAT 15 Dx Anemia )  Mode of Transport at Discharge: Other (see comment) (friend Chris Concepcion )  Transition of Care Consult (CM Consult): [address]: Yes  Discharge Durable Medical Equipment: No  Physical Therapy Consult: Yes  Speech Therapy Consult: No  Current Support Network: Other, Own Home (Maura Bernstein lives with patient in his home)  Confirm Follow Up Transport: Friends  Plan discussed with Pt/Family/Caregiver: Yes  Freedom of Choice Offered: Yes  Discharge Location  Discharge Placement: Home with home health  Patient to be d/c today. Will need O2 at discharge. Referral has been sent to [facility] which is in Network with patient insurance. Patient will need O2 at 3 liters continuous. Patient to be d/c home with [address] RN, PT/OT danny and caregiver friend Chris Concepcion.

## 2018-04-14 ENCOUNTER — HOME CARE VISIT (OUTPATIENT)
Dept: SCHEDULING | Facility: HOME HEALTH | Age: 73
End: 2018-04-14

## 2018-04-16 ENCOUNTER — HOME CARE VISIT (OUTPATIENT)
Dept: SCHEDULING | Facility: HOME HEALTH | Age: 73
End: 2018-04-16

## (undated) DEVICE — BLOCK BITE AD 60FR W/ VELC STRP ADDRESSES MOST PT AND

## (undated) DEVICE — CONNECTOR TBNG OD5-7MM O2 END DISP

## (undated) DEVICE — KENDALL RADIOLUCENT FOAM MONITORING ELECTRODE RECTANGULAR SHAPE: Brand: KENDALL

## (undated) DEVICE — CANNULA NSL ORAL AD FOR CAPNOFLEX CO2 O2 AIRLFE